# Patient Record
Sex: MALE | Race: WHITE | NOT HISPANIC OR LATINO | Employment: OTHER | URBAN - METROPOLITAN AREA
[De-identification: names, ages, dates, MRNs, and addresses within clinical notes are randomized per-mention and may not be internally consistent; named-entity substitution may affect disease eponyms.]

---

## 2017-02-09 ENCOUNTER — OFFICE VISIT (OUTPATIENT)
Dept: URGENT CARE | Facility: URGENT CARE | Age: 42
End: 2017-02-09
Payer: COMMERCIAL

## 2017-02-09 VITALS
OXYGEN SATURATION: 98 % | BODY MASS INDEX: 28.37 KG/M2 | HEART RATE: 119 BPM | WEIGHT: 192.2 LBS | TEMPERATURE: 98.5 F | DIASTOLIC BLOOD PRESSURE: 80 MMHG | SYSTOLIC BLOOD PRESSURE: 119 MMHG

## 2017-02-09 DIAGNOSIS — R07.9 CHEST PAIN, UNSPECIFIED TYPE: Primary | ICD-10-CM

## 2017-02-09 PROCEDURE — 93000 ELECTROCARDIOGRAM COMPLETE: CPT | Performed by: FAMILY MEDICINE

## 2017-02-09 PROCEDURE — 99215 OFFICE O/P EST HI 40 MIN: CPT | Performed by: FAMILY MEDICINE

## 2017-02-09 NOTE — MR AVS SNAPSHOT
"              After Visit Summary   2017    Carloz Nicolas    MRN: [id number]           Patient Information     Date Of Birth          1975        Visit Information        Provider Department      2017 7:25 PM Gina Chávez MD [facility]        Today's Diagnoses     Chest pain    -  1        Follow-ups after your visit        Who to contact     If you have questions or need follow up information about today's clinic visit or your schedule please contact [facility] directly at [phone].  Normal or non-critical lab and imaging results will be communicated to you by MyChart, letter or phone within 4 business days after the clinic has received the results. If you do not hear from us within 7 days, please contact the clinic through BreatheAmericahart or phone. If you have a critical or abnormal lab result, we will notify you by phone as soon as possible.  Submit refill requests through LivBlends or call your pharmacy and they will forward the refill request to us. Please allow 3 business days for your refill to be completed.          Additional Information About Your Visit        MyChart Information     LivBlends lets you send messages to your doctor, view your test results, renew your prescriptions, schedule appointments and more. To sign up, go to [email or web address] . Click on \"Log in\" on the left side of the screen, which will take you to the Welcome page. Then click on \"Sign up Now\" on the right side of the page.     You will be asked to enter the access code listed below, as well as some personal information. Please follow the directions to create your username and password.     Your access code is: [email or web address]  Expires: 5/10/2017  8:12 PM     Your access code will  in 90 days. If you need help or a new code, please call your [facility] or [phone].        Care EveryWhere ID     This is your Care EveryWhere ID. This could be used by other organizations to " access your [place] medical records  [id number]        Your Vitals Were     Pulse Temperature Pulse Oximetry             119 98.5  F (36.9  C) (Oral) 98%          Blood Pressure from Last 3 Encounters:   02/09/17 119/80   07/28/16 131/86   06/26/16 123/77    Weight from Last 3 Encounters:   02/09/17 192 lb 3.2 oz (87.181 kg)   07/28/16 192 lb 12.8 oz (87.454 kg)   06/26/16 189 lb (85.73 kg)              We Performed the Following     EKG 12-lead complete w/read - Clinics        Primary Care Provider    None Specified       No primary provider on file.        Thank you!     Thank you for choosing [facility]. Our goal is always to provide you with excellent care. Hearing back from our patients is one way we can continue to improve our services. Please take a few minutes to complete the written survey that you may receive in the mail after your visit with us. Thank you!             Your Updated Medication List - Protect others around you: Learn how to safely use, store and throw away your medicines at www.disposemymeds.org.          This list is accurate as of: 2/9/17  8:12 PM.  Always use your most recent med list.                   Brand Name Dispense Instructions for use    ascorbic acid 1000 MG Tabs    vitamin C     Take 500 mg by mouth       atorvastatin 20 MG tablet    LIPITOR     TAKE 1 TABLET BY MOUTH EVERY DAY       celeBREX 200 MG capsule   Generic drug:  celecoxib      Take 200 mg by mouth       gabapentin 300 MG capsule    NEURONTIN     Take 900 mg by mouth       MULTI-VITAMIN/MINERALS Tabs      Take 1 tablet by mouth       oxyCODONE 5 MG IR tablet    ROXICODONE     Take 1-2 tablets by mouth every 4 hours if needed for Pain (max 8 per day)   USE DATES:06/0116-6/15/2016       trimethoprim-polymyxin b ophthalmic solution    POLYTRIM    1 Bottle    1 drop every 2 hours while awake both eyes first day then 1 to 2 drops every 6 hours until 2 days after redness is clear

## 2017-02-10 NOTE — PROGRESS NOTES
Cc: chest pain    Has a history of pericarditis    Has been having then past 2-3 weeks    Today was really bad. Still having chest pain  Very painful and constant    Also worse with laying down    Worse with exertion relieved by rest: YES  Worse with certain movements or position: YES  Associated with food intake or symptoms of GERD: no  Worse with taking in a deep breath: no  Cough/colds or respiratory symptoms: no  Signs or symptoms of DVT no    Problem list, Medication list, Allergies, and Medical/Social/Surgical histories reviewed in [facility] and updated as appropriate.      ROS:  General: negative for fever  Resp: chest pain as above  CV: + as above  ABD: Negative for abd pain.  Neurologic:negative for headache  10 point review of systems negative except for noted above.    OBJECTIVE:  /80 mmHg  Pulse 119  Temp(Src) 98.5  F (36.9  C) (Oral)  Wt 192 lb 3.2 oz (87.181 kg)  SpO2 98%   General:   awake, alert, and cooperative.  NAD.   Head: Normocephalic, atraumatic.  Eyes: Conjunctiva clear,   ENT: normal exam  Heart: Regular rate and rhythm. No murmur.  Lungs: Chest is clear; no wheezes or rales.   Abdomen: soft nontender  Neuro: Alert and oriented - normal speech. No gross neurologic deficits  Psych: Appropriate mood and affect.   Musculoskeletal: no joint swelling. no chest wall tenderness reproducible of pain. No calf pain or tenderness. No signs or symptoms of DVT   Vascular: no cyanosis      EKG done in clinic, reviewed this myself official cardiology report pending: no acute ST-Twave changes but has sinus tachycardia      ASSESSMENT:    ICD-10-CM    1. Chest pain R07.9 EKG 12-lead complete w/read - Clinics       ICD-10-CM    1. Chest pain R07.9 EKG 12-lead complete w/read - Clinics         PLAN:   Patient complaining of active chest pain  Given 4 baby ASA and oxygen 2L by nasal cannula   Transferred to ER for further evaluation and management and rule out of acute cardiorespiratory process. Discussed  with receiving ER facility physician/charge nurse. Patient went to [facility] by ambulance  Advised about symptoms which might herald more serious problems.    If with any worsening symptoms of chest pain or shortness of breath, or change in the quality of your symptoms, please proceed to ER. Recommend follow up with PCP in a few days for re-evaluation.       Gina Chávez MD

## 2017-02-10 NOTE — PROGRESS NOTES
"    SUBJECTIVE:                                                    Carloz Nicolas is a 41 year old male who presents to clinic today for the following health issues:      Chest Pain      Onset: ***    Description (location/character/radiation/duration): ***    Intensity:  severe    Accompanying signs and symptoms:        Shortness of breath: YES       Sweating: YES       Nausea/vomitting: no        Palpitations: YES       Other (fevers/chills/cough/heartburn/lightheadedness): { :328207}    History (similar episodes/previous evaluation): {NONE DEFAULTED:751505::\"None\"}    Precipitating or alleviating factors:       Worse with exertion: YES       Worse with breathing: YES       Related to eating: { :505904}       Better with burping: { :956038}    Therapies tried and outcome: attempted to go to ER and they had a 4 hour wait so he came here, informed him he would meet with provider and she will review his EKG.       {additional problems for provider to add:772613}    Problem list and histories reviewed & adjusted, as indicated.  Additional history: {NONE - AS DOCUMENTED:653514::\"as documented\"}    {HIST REVIEW/ LINKS 2:135269}    {PROVIDER CHARTING PREFERENCE:833777}    "

## 2017-02-10 NOTE — NURSING NOTE
Called ambulance and designated someone from the  to keep watch for them and they let them in and the ambulance brought pt to [facility].  Mirella Parker, MICHAEL

## 2017-02-10 NOTE — NURSING NOTE
"Chief Complaint   Patient presents with     Heart Problem     hx of pericarditis 5 years ago from chicken pox.       Initial /80 mmHg  Pulse 119  Temp(Src) 98.5  F (36.9  C) (Oral)  Wt 192 lb 3.2 oz (87.181 kg)  SpO2 98% Estimated body mass index is 28.37 kg/(m^2) as calculated from the following:    Height as of 6/26/16: 5' 9\" (1.753 m).    Weight as of this encounter: 192 lb 3.2 oz (87.181 kg).  Medication Reconciliation: complete   Mirella Parker, MICHAEL      "

## 2017-11-01 ENCOUNTER — OFFICE VISIT (OUTPATIENT)
Dept: FAMILY MEDICINE | Facility: CLINIC | Age: 42
End: 2017-11-01
Payer: COMMERCIAL

## 2017-11-01 VITALS
DIASTOLIC BLOOD PRESSURE: 83 MMHG | HEART RATE: 102 BPM | WEIGHT: 195 LBS | TEMPERATURE: 98.4 F | SYSTOLIC BLOOD PRESSURE: 134 MMHG | BODY MASS INDEX: 28.8 KG/M2 | OXYGEN SATURATION: 98 %

## 2017-11-01 DIAGNOSIS — D23.5 CYST, DERMOID, TRUNK: ICD-10-CM

## 2017-11-01 DIAGNOSIS — M67.432 GANGLION CYST OF WRIST, LEFT: Primary | ICD-10-CM

## 2017-11-01 PROCEDURE — 99213 OFFICE O/P EST LOW 20 MIN: CPT | Performed by: INTERNAL MEDICINE

## 2017-11-01 NOTE — NURSING NOTE
"Chief Complaint   Patient presents with     Mass     left forearm X at least 1 year, growing and getting painful       Initial /83  Pulse 102  Temp 98.4  F (36.9  C) (Oral)  Wt 195 lb (88.5 kg)  SpO2 98%  BMI 28.8 kg/m2 Estimated body mass index is 28.8 kg/(m^2) as calculated from the following:    Height as of 6/26/16: 5' 9\" (1.753 m).    Weight as of this encounter: 195 lb (88.5 kg).  Medication Reconciliation: complete   Saniya Us CMA      "

## 2017-11-01 NOTE — MR AVS SNAPSHOT
After Visit Summary   11/1/2017    Carloz Nicolas    MRN: [id number]           Patient Information     Date Of Birth          1975        Visit Information        Provider Department      11/1/2017 3:40 PM Dorothy Kang MD [facility]        Today's Diagnoses     Ganglion cyst of wrist, left    -  1    Cyst, dermoid, trunk           Follow-ups after your visit        Additional Services     GENERAL SURG ADULT REFERRAL       Your provider has referred you to: [facility]: [facility] [phone]   [email or web address]    Please be aware that coverage of these services is subject to the terms and limitations of your health insurance plan.  Call member services at your health plan with any benefit or coverage questions.      Please bring the following with you to your appointment:    (1) Any X-Rays, CTs or MRIs which have been performed.  Contact the facility where they were done to arrange for  prior to your scheduled appointment.   (2) List of current medications   (3) This referral request   (4) Any documents/labs given to you for this referral                  Follow-up notes from your care team     Return in about 1 week (around 11/8/2017) for with surgeon.      Who to contact     If you have questions or need follow up information about today's clinic visit or your schedule please contact [facility] directly at [phone].  Normal or non-critical lab and imaging results will be communicated to you by MyChart, letter or phone within 4 business days after the clinic has received the results. If you do not hear from us within 7 days, please contact the clinic through MyChart or phone. If you have a critical or abnormal lab result, we will notify you by phone as soon as possible.  Submit refill requests through Fanvibe or call your pharmacy and they will forward the refill request to us. Please allow 3 business days for  "your refill to be completed.          Additional Information About Your Visit        TabletKiosk[employer]The [employer]-Team Clubhouse Information     [employer] lets you send messages to your doctor, view your test results, renew your prescriptions, schedule appointments and more. To sign up, go to [email or web address] . Click on \"Log in\" on the left side of the screen, which will take you to the Welcome page. Then click on \"Sign up Now\" on the right side of the page.     You will be asked to enter the access code listed below, as well as some personal information. Please follow the directions to create your username and password.     Your access code is: [email or web address]  Expires: 2018  3:57 PM     Your access code will  in 90 days. If you need help or a new code, please call your [place] clinic or [phone].        Care EveryWhere ID     This is your Care EveryWhere ID. This could be used by other organizations to access your [place] medical records  [id number]        Your Vitals Were     Pulse Temperature Pulse Oximetry BMI (Body Mass Index)          102 98.4  F (36.9  C) (Oral) 98% 28.8 kg/m2         Blood Pressure from Last 3 Encounters:   17 134/83   17 119/80   16 131/86    Weight from Last 3 Encounters:   17 195 lb (88.5 kg)   17 192 lb 3.2 oz (87.2 kg)   16 192 lb 12.8 oz (87.5 kg)              We Performed the Following     GENERAL SURG ADULT REFERRAL        Primary Care Provider Office Phone # Fax #    Zeyad Ferreira PA-C [phone] [address] [address] [facility] [address]        Equal Access to Services     ERIKA CALLEJAS : Kilo Gamboa, dustin johnson, wendi espinozaalmajonathan perez, nicholas kwong. So [facility] [phone].    ATENCIÓN: Si habla español, tiene a isaac disposición servicios gratuitos de asistencia lingüística. Llame al [phone].    We comply with applicable federal civil rights laws and [place] laws. We do not " discriminate on the basis of race, color, national origin, age, disability, sex, sexual orientation, or gender identity.            Thank you!     Thank you for choosing [facility]  [facility]. Our goal is always to provide you with excellent care. Hearing back from our patients is one way we can continue to improve our services. Please take a few minutes to complete the written survey that you may receive in the mail after your visit with us. Thank you!             Your Updated Medication List - Protect others around you: Learn how to safely use, store and throw away your medicines at www.disposemymeds.org.          This list is accurate as of: 11/1/17  4:04 PM.  Always use your most recent med list.                   Brand Name Dispense Instructions for use Diagnosis    ascorbic acid 1000 MG Tabs    vitamin C     Take 500 mg by mouth        atorvastatin 20 MG tablet    LIPITOR     TAKE 1 TABLET BY MOUTH EVERY DAY        celeBREX 200 MG capsule   Generic drug:  celecoxib      Take 200 mg by mouth        gabapentin 300 MG capsule    NEURONTIN     Take 900 mg by mouth        MULTI-VITAMIN/MINERALS Tabs      Take 1 tablet by mouth        oxyCODONE 5 MG IR tablet    ROXICODONE     Take 1-2 tablets by mouth every 4 hours if needed for Pain (max 8 per day)   USE DATES:06/0116-6/15/2016

## 2017-11-01 NOTE — PROGRESS NOTES
SUBJECTIVE:  Carloz Nicolas is an 42 year old male who presents for lump on left lower arm.  Not sure how long he's had it, but at least a year.  Seems to be growing and getting more painful.  Notices it at various times, notices it with certain movements, especially putting arms above head.  Pain seems to radiate up from the bump and isn't so much the bump itself that hurts.  No fevers, chills.  No skin rashes.  No recent travel.  Not hurt to make a fist.  Hasn't ever become smaller, but seems to keep gradually getting bigger.  Also has a bump on back growing over three years, doesn't hurt and hasn't drained.        PMH: high cholesterol, chicken pox as adult, kidney stones, pneumonia, crps,pericarditis, epididymitis   ALLERGIES:  Review of patient's allergies indicates no known allergies.    Current Outpatient Prescriptions   Medication     ascorbic acid (VITAMIN C) 1000 MG TABS     gabapentin (NEURONTIN) 300 MG capsule     Multiple Vitamins-Minerals (MULTI-VITAMIN/MINERALS) TABS     oxyCODONE (ROXICODONE) 5 MG immediate release tablet     celecoxib (CELEBREX) 200 MG capsule     atorvastatin (LIPITOR) 20 MG tablet     No current facility-administered medications for this visit.          ROS:  ROS is done and is negative for general, constitutional, eye, ENT, Respiratory, cardiovascular, GI, , Skin, musculoskeletal except as noted elsewhere.      OBJECTIVE:  /83  Pulse 102  Temp 98.4  F (36.9  C) (Oral)  Wt 195 lb (88.5 kg)  SpO2 98%  BMI 28.8 kg/m2  GENERAL APPEARANCE: Alert, in no acute distress  EYES: normal  NOSE:normal  OROPHARYNX:normal  NECK:No adenopathy,masses or thyromegaly  RESP: normal and clear to auscultation  CV:regular rate and rhythm and no murmurs, clicks, or gallops  ABDOMEN: Abdomen soft, non-tender. BS normal. No masses, organomegaly  LEFT ARM: proximal to anterior wrist, there is a 12 mm firm, mobile mass, non-tender, no erythema or bruising or surrounding edema  BACK: lower mid  back just left of midline, just left of surgical scar, there is a 1 cm firm mass, non-tender, no edema or erythema or bruising.    RESULTS  .  No results found for this or any previous visit (from the past 48 hour(s)).    ASSESSMENT/PLAN:    ASSESSMENT / PLAN:  (M67.432) Ganglion cyst of wrist, left  (primary encounter diagnosis)  Comment: arm mass most c/w ganglion cyst  Plan: GENERAL SURG ADULT REFERRAL        Refer to surgery to further evaluate type of mass and if removal indicated.  I reviewed supportive care, expected course, and signs of concern.  Follow up as needed or if worsens in any way.  Reviewed red flag symptoms and is to go to the ER if experiences any of these.    (D23.5) Cyst, dermoid, trunk  Comment: back mass most c/w benign cyst  Plan: GENERAL SURG ADULT REFERRAL        Refer to surgery to further evaluate the lesion and determine if removal indicated.  I reviewed supportive care, expected course, and signs of concern.  Follow up as needed or if worsens in any way.  Reviewed red flag symptoms and is to go to the ER if experiences any of these.    Pt agrees to schedule appt with surgeon soon.      See [facility] for orders, medications, letters, patient instructions    Dorothy Kang M.D.

## 2018-04-26 ENCOUNTER — OFFICE VISIT (OUTPATIENT)
Dept: URGENT CARE | Facility: URGENT CARE | Age: 43
End: 2018-04-26
Payer: COMMERCIAL

## 2018-04-26 VITALS
DIASTOLIC BLOOD PRESSURE: 89 MMHG | SYSTOLIC BLOOD PRESSURE: 161 MMHG | HEART RATE: 118 BPM | RESPIRATION RATE: 16 BRPM | TEMPERATURE: 97.7 F | OXYGEN SATURATION: 97 % | WEIGHT: 194.8 LBS | BODY MASS INDEX: 28.77 KG/M2

## 2018-04-26 DIAGNOSIS — R07.9 ACUTE CHEST PAIN: Primary | ICD-10-CM

## 2018-04-26 PROCEDURE — 99215 OFFICE O/P EST HI 40 MIN: CPT | Performed by: FAMILY MEDICINE

## 2018-04-26 PROCEDURE — 93000 ELECTROCARDIOGRAM COMPLETE: CPT | Performed by: FAMILY MEDICINE

## 2018-04-26 NOTE — MR AVS SNAPSHOT
"              After Visit Summary   2018    Carloz Nicolas    MRN: [id number]           Patient Information     Date Of Birth          1975        Visit Information        Provider Department      2018 8:55 PM Sean Geiger MD [facility]        Today's Diagnoses     Acute chest pain    -  1       Follow-ups after your visit        Who to contact     If you have questions or need follow up information about today's clinic visit or your schedule please contact [facility] directly at [phone].  Normal or non-critical lab and imaging results will be communicated to you by MyChart, letter or phone within 4 business days after the clinic has received the results. If you do not hear from us within 7 days, please contact the clinic through MyChart or phone. If you have a critical or abnormal lab result, we will notify you by phone as soon as possible.  Submit refill requests through ViaView or call your pharmacy and they will forward the refill request to us. Please allow 3 business days for your refill to be completed.          Additional Information About Your Visit        MyChart Information     ViaView lets you send messages to your doctor, view your test results, renew your prescriptions, schedule appointments and more. To sign up, go to [email or web address] . Click on \"Log in\" on the left side of the screen, which will take you to the Welcome page. Then click on \"Sign up Now\" on the right side of the page.     You will be asked to enter the access code listed below, as well as some personal information. Please follow the directions to create your username and password.     Your access code is: [email or web address]  Expires: 2018  8:09 AM     Your access code will  in 90 days. If you need help or a new code, please call your [facility] or [phone].        Care EveryWhere ID     This is your Care EveryWhere ID. This could be used by other organizations to " access your [place] medical records  [id number]        Your Vitals Were     Pulse Temperature Respirations Pulse Oximetry BMI (Body Mass Index)       118 97.7  F (36.5  C) 16 97% 28.77 kg/m2        Blood Pressure from Last 3 Encounters:   04/26/18 161/89   11/01/17 134/83   02/09/17 119/80    Weight from Last 3 Encounters:   04/26/18 194 lb 12.8 oz (88.4 kg)   11/01/17 195 lb (88.5 kg)   02/09/17 192 lb 3.2 oz (87.2 kg)              We Performed the Following     EKG 12-lead complete w/read - Clinics        Primary Care Provider Office Phone # Fax #    Zeyad Ferreira PA-C [phone] [address] [facility] [address]        Equal Access to Services     [facility]: Hadii aad ku hadasho Soomaali, waaxda luqadaha, qaybta kaalmada adeegyada, nicholas fry haynathen noriega . So [facility] [phone].    ATENCIÓN: Si habla español, tiene a isaac disposición servicios gratuitos de asistencia lingüística. Llame al [phone].    We comply with applicable federal civil rights laws and [place] laws. We do not discriminate on the basis of race, color, national origin, age, disability, sex, sexual orientation, or gender identity.            Thank you!     Thank you for choosing [facility]  for your care. Our goal is always to provide you with excellent care. Hearing back from our patients is one way we can continue to improve our services. Please take a few minutes to complete the written survey that you may receive in the mail after your visit with us. Thank you!             Your Updated Medication List - Protect others around you: Learn how to safely use, store and throw away your medicines at www.disposemymeds.org.          This list is accurate as of 4/26/18 11:59 PM.  Always use your most recent med list.                   Brand Name Dispense Instructions for use Diagnosis    ascorbic acid 1000 MG Tabs    vitamin C     Take 500 mg by mouth        atorvastatin 20 MG  tablet    LIPITOR     TAKE 1 TABLET BY MOUTH EVERY DAY        celeBREX 200 MG capsule   Generic drug:  celecoxib      Take 200 mg by mouth        gabapentin 300 MG capsule    NEURONTIN     Take 900 mg by mouth        MULTI-VITAMIN/MINERALS Tabs      Take 1 tablet by mouth        oxyCODONE IR 5 MG tablet    ROXICODONE     Take 1-2 tablets by mouth every 4 hours if needed for Pain (max 8 per day)   USE DATES:06/0116-6/15/2016

## 2018-04-27 NOTE — PROGRESS NOTES
SUBJECTIVE:                                                    Carloz Nicolas is a 42 year old male who presents to clinic today for the following health issues:      Chest Pain      Onset: yesterday afternoon     Description (location/character/radiation/duration): left sided chest dull ache, radiating numbness to left arm     Intensity:  Mild to moderate    Accompanying signs and symptoms:        Shortness of breath: no        Sweating: no        Nausea/vomitting: nauseous        Palpitations: no        Other (fevers/chills/cough/heartburn/lightheadedness): no     History (similar episodes/previous evaluation): History of pericarditis    Precipitating or alleviating factors:       Worse with exertion: no        Worse with breathing: no        Related to eating: no        Better with burping: no     Therapies tried and outcome: None    Ex-smoker, owns business, denies any other relevant systemic symptoms        Problem list and histories reviewed & adjusted, as indicated.  Additional history: as documented    There is no problem list on file for this patient.    No past surgical history on file.    Social History   Substance Use Topics     Smoking status: Former Smoker     Packs/day: 1.00     Smokeless tobacco: Not on file     Alcohol use No     No family history on file.      Current Outpatient Prescriptions   Medication Sig Dispense Refill     ascorbic acid (VITAMIN C) 1000 MG TABS Take 500 mg by mouth       atorvastatin (LIPITOR) 20 MG tablet TAKE 1 TABLET BY MOUTH EVERY DAY       celecoxib (CELEBREX) 200 MG capsule Take 200 mg by mouth       gabapentin (NEURONTIN) 300 MG capsule Take 900 mg by mouth       Multiple Vitamins-Minerals (MULTI-VITAMIN/MINERALS) TABS Take 1 tablet by mouth       oxyCODONE (ROXICODONE) 5 MG immediate release tablet Take 1-2 tablets by mouth every 4 hours if needed for Pain (max 8 per day)   USE DATES:06/0116-6/15/2016       No Known Allergies  No lab results found.   BP Readings  from Last 3 Encounters:   11/01/17 134/83   02/09/17 119/80   07/28/16 131/86    Wt Readings from Last 3 Encounters:   11/01/17 195 lb (88.5 kg)   02/09/17 192 lb 3.2 oz (87.2 kg)   07/28/16 192 lb 12.8 oz (87.5 kg)                  Labs reviewed in EPIC    ROS:  Constitutional, HEENT, cardiovascular, pulmonary, GI, , musculoskeletal, neuro, skin, endocrine and psych systems are negative, except as otherwise noted.    OBJECTIVE:     /89  Pulse 118  Temp 97.7  F (36.5  C)  Resp 16  Wt 194 lb 12.8 oz (88.4 kg)  SpO2 97%  BMI 28.77 kg/m2    GENERAL: alert and no distress  EYES: Eyes grossly normal to inspection, PERRL and conjunctivae and sclerae normal  NECK: no adenopathy, no asymmetry, masses, or scars and thyroid normal to palpation  RESP: lungs clear to auscultation - no rales, rhonchi or wheezes  CV: tachycardia, normal S1 S2, no S3 or S4, no murmur, click or rub, peripheral pulses strong and no peripheral edema  ABDOMEN: soft, nontender, no hepatosplenomegaly, no masses and bowel sounds normal  MS: no gross musculoskeletal defects noted, no edema  SKIN: no suspicious lesions or rashes  NEURO: Normal strength and tone, mentation intact and speech normal    ECG: NSR    ASSESSMENT/PLAN:         ICD-10-CM    1. Acute chest pain R07.9 EKG 12-lead complete w/read - Clinics       42-year-old male presents with left-sided chest pain radiating to left arm along with some nausea, symptoms started yesterday, constant, 5 out of 10 intensity.  Previous history of pericarditis, ex-smoker.  Physical examination remarkable for mild hypertension along with tachycardia. EKG showed no ischemic changes.  Differentials are broad including non-STEMI, noted similar episode back in February 2017, ER workup was negative at that time.  Chewable 81 mg aspirin given to the patient. Patient deferred ER transfer by ambulance, son will drive him. [facility] physician informed.  Patient understood and in agreement with the above  plan.  All questions answered.      Sean Geiger MD  [facility]

## 2018-04-27 NOTE — NURSING NOTE
The following medication was given:     MEDICATION: Adult Aspirin   ROUTE: PO  SITE: Medication was given orally   DOSE: 324mg   LOT #: [id number]  :  [employer]  EXPIRATION DATE:  1/01/2019  NDC: 92588-116-77    Angela Phelan MA

## 2018-06-14 ENCOUNTER — OFFICE VISIT (OUTPATIENT)
Dept: FAMILY MEDICINE | Facility: CLINIC | Age: 43
End: 2018-06-14
Payer: COMMERCIAL

## 2018-06-14 VITALS
TEMPERATURE: 98.4 F | DIASTOLIC BLOOD PRESSURE: 79 MMHG | OXYGEN SATURATION: 96 % | SYSTOLIC BLOOD PRESSURE: 123 MMHG | BODY MASS INDEX: 28.5 KG/M2 | HEART RATE: 109 BPM | WEIGHT: 193 LBS

## 2018-06-14 DIAGNOSIS — M25.50 MULTIPLE JOINT PAIN: Primary | ICD-10-CM

## 2018-06-14 LAB
ALBUMIN SERPL-MCNC: 3.9 G/DL (ref 3.4–5)
ALP SERPL-CCNC: 83 U/L (ref 40–150)
ALT SERPL W P-5'-P-CCNC: 29 U/L (ref 0–70)
ANION GAP SERPL CALCULATED.3IONS-SCNC: 7 MMOL/L (ref 3–14)
AST SERPL W P-5'-P-CCNC: 19 U/L (ref 0–45)
BASOPHILS # BLD AUTO: 0 10E9/L (ref 0–0.2)
BASOPHILS NFR BLD AUTO: 0.3 %
BILIRUB SERPL-MCNC: 0.4 MG/DL (ref 0.2–1.3)
BUN SERPL-MCNC: 15 MG/DL (ref 7–30)
CALCIUM SERPL-MCNC: 8.9 MG/DL (ref 8.5–10.1)
CHLORIDE SERPL-SCNC: 104 MMOL/L (ref 94–109)
CO2 SERPL-SCNC: 27 MMOL/L (ref 20–32)
CREAT SERPL-MCNC: 0.95 MG/DL (ref 0.66–1.25)
DIFFERENTIAL METHOD BLD: ABNORMAL
EOSINOPHIL # BLD AUTO: 0.2 10E9/L (ref 0–0.7)
EOSINOPHIL NFR BLD AUTO: 2.6 %
ERYTHROCYTE [DISTWIDTH] IN BLOOD BY AUTOMATED COUNT: 13.1 % (ref 10–15)
ERYTHROCYTE [SEDIMENTATION RATE] IN BLOOD BY WESTERGREN METHOD: 4 MM/H (ref 0–15)
GFR SERPL CREATININE-BSD FRML MDRD: 87 ML/MIN/1.7M2
GLUCOSE SERPL-MCNC: 103 MG/DL (ref 70–99)
HCT VFR BLD AUTO: 52.1 % (ref 40–53)
HGB BLD-MCNC: 18.4 G/DL (ref 13.3–17.7)
LYMPHOCYTES # BLD AUTO: 2.8 10E9/L (ref 0.8–5.3)
LYMPHOCYTES NFR BLD AUTO: 40.8 %
MCH RBC QN AUTO: 32.1 PG (ref 26.5–33)
MCHC RBC AUTO-ENTMCNC: 35.3 G/DL (ref 31.5–36.5)
MCV RBC AUTO: 91 FL (ref 78–100)
MONOCYTES # BLD AUTO: 0.6 10E9/L (ref 0–1.3)
MONOCYTES NFR BLD AUTO: 9.1 %
NEUTROPHILS # BLD AUTO: 3.2 10E9/L (ref 1.6–8.3)
NEUTROPHILS NFR BLD AUTO: 47.2 %
PLATELET # BLD AUTO: 260 10E9/L (ref 150–450)
POTASSIUM SERPL-SCNC: 4.5 MMOL/L (ref 3.4–5.3)
PROT SERPL-MCNC: 7.6 G/DL (ref 6.8–8.8)
RBC # BLD AUTO: 5.73 10E12/L (ref 4.4–5.9)
SODIUM SERPL-SCNC: 138 MMOL/L (ref 133–144)
WBC # BLD AUTO: 6.8 10E9/L (ref 4–11)

## 2018-06-14 PROCEDURE — 80053 COMPREHEN METABOLIC PANEL: CPT | Performed by: INTERNAL MEDICINE

## 2018-06-14 PROCEDURE — 99214 OFFICE O/P EST MOD 30 MIN: CPT | Performed by: INTERNAL MEDICINE

## 2018-06-14 PROCEDURE — 86431 RHEUMATOID FACTOR QUANT: CPT | Performed by: INTERNAL MEDICINE

## 2018-06-14 PROCEDURE — 86618 LYME DISEASE ANTIBODY: CPT | Performed by: INTERNAL MEDICINE

## 2018-06-14 PROCEDURE — 85652 RBC SED RATE AUTOMATED: CPT | Performed by: INTERNAL MEDICINE

## 2018-06-14 PROCEDURE — 36415 COLL VENOUS BLD VENIPUNCTURE: CPT | Performed by: INTERNAL MEDICINE

## 2018-06-14 PROCEDURE — 85025 COMPLETE CBC W/AUTO DIFF WBC: CPT | Performed by: INTERNAL MEDICINE

## 2018-06-14 RX ORDER — ZOLPIDEM TARTRATE 5 MG/1
5 TABLET ORAL
COMMUNITY

## 2018-06-14 NOTE — PROGRESS NOTES
SUBJECTIVE:  Carloz Nicolas is an 42 year old male who presents for aching.  All of joints seem to feel stiff and achy, less in hands and wrists than other joints.  Sometimes feels like nerve pain, sometimes muscle pain.  Wants to be tested for lyme disease.  Feels tired.  Has crps over the past 5 years and is generally localized to his knee and has been managed in a way that has been okay for him.  Sometimes wakes up at night from pain.  The bad all over aching has been over the past 2-3 months, worse over the past 2-3 weeks.  Had some epidurals for neck in past.  Also has spinal stimulator implanted for his pain which in past has helped but isn't helping his current pain.  The pain he's been having is different than his usual type of pain.  Is on oxycodone for his ongoing pain. Is also on celebrex and gabapentin.  No recent travel.  Has had two deer ticks on skin but not sure if just crawling on him or if attached, one about a month ago and one last week.  No fevers, chills, sweats.  No n/v/d except occasional nausea from his pain medications.  No new activities or falls or accidents.      PMH: crps  Social History     Social History     Marital status:      Spouse name: N/A     Number of children: N/A     Years of education: N/A     Social History Main Topics     Smoking status: Former Smoker     Packs/day: 1.00     Smokeless tobacco: Not on file     Alcohol use No     Drug use: No     Sexual activity: Not on file     Other Topics Concern     Not on file     Social History Narrative       FH: neg for joint problems  ALLERGIES:  Review of patient's allergies indicates no known allergies.    Current Outpatient Prescriptions   Medication     ascorbic acid (VITAMIN C) 1000 MG TABS     celecoxib (CELEBREX) 200 MG capsule     gabapentin (NEURONTIN) 300 MG capsule     Multiple Vitamins-Minerals (MULTI-VITAMIN/MINERALS) TABS     oxyCODONE (ROXICODONE) 5 MG immediate release tablet     zolpidem (AMBIEN) 5 MG tablet      No current facility-administered medications for this visit.          ROS:  ROS is done and is negative for general/constitutional, eye, ENT, Respiratory, cardiovascular, GI, , Skin, musculoskeletal except as noted elsewhere.  All other review of systems negative except as noted elsewhere.      OBJECTIVE:  /79  Pulse 109  Temp 98.4  F (36.9  C) (Oral)  Wt 193 lb (87.5 kg)  SpO2 96%  BMI 28.5 kg/m2  GENERAL APPEARANCE: Alert, in no acute distress  EYES: normal  EARS: External ears normal. Canals clear. TM's normal.  NOSE:normal  OROPHARYNX:normal  NECK:No adenopathy,masses or thyromegaly  RESP: normal and clear to auscultation  CV:regular rate and rhythm and no murmurs, clicks, or gallops  ABDOMEN: Abdomen soft, non-tender. BS normal. No masses, organomegaly  SKIN: no ulcers, lesions or rash  MUSCULOSKELETAL:Musculoskeletal normal.  Joints are non-tender to palpation.  No edema of joints.      RESULTS  .  No results found for this or any previous visit (from the past 48 hour(s)).    ASSESSMENT/PLAN:    ASSESSMENT / PLAN:  (M25.50) Multiple joint pain  (primary encounter diagnosis)  Comment: it feels different to the pt than his crps, although could still be related.  Also consider other etiologies such as infectious, rheumatologic, arthritis, etc.  Plan: Erythrocyte sedimentation rate auto, CBC with         platelets differential, Rheumatoid factor,         RHEUMATOLOGY REFERRAL, Comprehensive metabolic         panel, Lyme Disease Mehnaz with reflex to WB Serum        Discussed with pt and will do some initial labs today and have him f/u with rheumatologist soon. I reviewed supportive care, expected course, and signs of concern.  Follow up with rheumatology soon or see pcp if worsens in any way.  Reviewed red flag symptoms and is to go to the ER if experiences any of these.      See [facility] for orders, medications, letters, patient instructions    Dorothy Kang M.D.

## 2018-06-14 NOTE — MR AVS SNAPSHOT
After Visit Summary   6/14/2018    Carloz Nicolas    MRN: [id number]           Patient Information     Date Of Birth          1975        Visit Information        Provider Department      6/14/2018 1:20 PM Dorothy Kang MD [facility]        Today's Diagnoses     Multiple joint pain    -  1       Follow-ups after your visit        Additional Services     RHEUMATOLOGY REFERRAL       Your provider has referred you to:   FM[facility]: [facility] [phone]   [email or web address]  FMG: [facility] [place] [phone]   [email or web address]  UMP: [facility] [phone]   [email or web address]  FH[facility]: [facility] [phone] Fax [phone] [email or web address]  [facility] [place] [phone]   [email or web address]    Please be aware that coverage of these services is subject to the terms and limitations of your health insurance plan.  Call member services at your health plan with any benefit or coverage questions.      Please bring the following with you to your appointment:    (1) Any X-Rays, CTs or MRIs which have been performed.  Contact the facility where they were done to arrange for  prior to your scheduled appointment.    (2) List of current medications   (3) This referral request   (4) Any documents/labs given to you for this referral                  Follow-up notes from your care team     Return in about 1 week (around 6/21/2018) for rheumatologist.      Who to contact     If you have questions or need follow up information about today's clinic visit or your schedule please contact [facility] directly at [phone].  Normal or non-critical lab and imaging results will be communicated to you by Rafael  letter or phone within 4 business days after the clinic has received the results. If you do not hear from us within 7 days, please contact the clinic through [employer] or phone. If you have a critical or abnormal lab result, we will notify you by phone as soon as possible.  Submit refill requests through [employer] or call your pharmacy and they will forward the refill request to us. Please allow 3 business days for your refill to be completed.          Additional Information About Your Visit        Care EveryWhere ID     This is your Care EveryWhere ID. This could be used by other organizations to access your [facility] medical records  [id number]        Your Vitals Were     Pulse Temperature Pulse Oximetry BMI (Body Mass Index)          109 98.4  F (36.9  C) (Oral) 96% 28.5 kg/m2         Blood Pressure from Last 3 Encounters:   06/14/18 123/79   04/26/18 161/89   11/01/17 134/83    Weight from Last 3 Encounters:   06/14/18 193 lb (87.5 kg)   04/26/18 194 lb 12.8 oz (88.4 kg)   11/01/17 195 lb (88.5 kg)              We Performed the Following     CBC with platelets differential     Comprehensive metabolic panel     Erythrocyte sedimentation rate auto     Lyme Disease Mehnaz with reflex to WB Serum     Rheumatoid factor     RHEUMATOLOGY REFERRAL        Primary Care Provider Office Phone # Fax #    Zeyad Ferreira PA-C [phone] [facility] [address]        Equal Access to Services     KAREN CALLEJAS : Hadii aad ku hadasho Sojodyali, waaxda luqadaha, qaybta kaalmada ademariajoseyada, nicholas noriega . So [facility] [place] [facility] [phone].    ATENCIÓN: Si habla español, tiene a isaac disposición servicios gratuitos de asistencia lingüística. Llame al [phone].    We comply with applicable federal civil rights laws and [place] laws. We do not discriminate on the basis of race, color, national origin, age, disability, sex, sexual orientation, or gender identity.            Thank you!      Thank you for choosing [facility]  for your care. Our goal is always to provide you with excellent care. Hearing back from our patients is one way we can continue to improve our services. Please take a few minutes to complete the written survey that you may receive in the mail after your visit with us. Thank you!             Your Updated Medication List - Protect others around you: Learn how to safely use, store and throw away your medicines at www.disposemymeds.org.          This list is accurate as of 6/14/18  1:56 PM.  Always use your most recent med list.                   Brand Name Dispense Instructions for use Diagnosis    ascorbic acid 1000 MG Tabs    vitamin C     Take 500 mg by mouth        celeBREX 200 MG capsule   Generic drug:  celecoxib      Take 200 mg by mouth        gabapentin 300 MG capsule    NEURONTIN     Take 900 mg by mouth        MULTI-VITAMIN/MINERALS Tabs      Take 1 tablet by mouth        oxyCODONE IR 5 MG tablet    ROXICODONE     Take 1-2 tablets by mouth every 4 hours if needed for Pain (max 8 per day)   USE DATES:06/0116-6/15/2016        zolpidem 5 MG tablet    AMBIEN     Take 5 mg by mouth nightly as needed

## 2018-06-15 LAB
B BURGDOR IGG+IGM SER QL: 0.06 (ref 0–0.89)
RHEUMATOID FACT SER NEPH-ACNC: <20 IU/ML (ref 0–20)

## 2018-10-26 ENCOUNTER — RADIANT APPOINTMENT (OUTPATIENT)
Dept: GENERAL RADIOLOGY | Facility: CLINIC | Age: 43
End: 2018-10-26
Attending: PHYSICIAN ASSISTANT
Payer: COMMERCIAL

## 2018-10-26 ENCOUNTER — OFFICE VISIT (OUTPATIENT)
Dept: FAMILY MEDICINE | Facility: CLINIC | Age: 43
End: 2018-10-26
Payer: COMMERCIAL

## 2018-10-26 VITALS
TEMPERATURE: 98.2 F | WEIGHT: 206 LBS | BODY MASS INDEX: 30.51 KG/M2 | OXYGEN SATURATION: 96 % | SYSTOLIC BLOOD PRESSURE: 132 MMHG | HEIGHT: 69 IN | RESPIRATION RATE: 16 BRPM | DIASTOLIC BLOOD PRESSURE: 76 MMHG | HEART RATE: 117 BPM

## 2018-10-26 DIAGNOSIS — G56.03 BILATERAL CARPAL TUNNEL SYNDROME: ICD-10-CM

## 2018-10-26 DIAGNOSIS — M25.511 CHRONIC PAIN OF BOTH SHOULDERS: ICD-10-CM

## 2018-10-26 DIAGNOSIS — G89.29 CHRONIC PAIN OF BOTH SHOULDERS: ICD-10-CM

## 2018-10-26 DIAGNOSIS — M25.512 CHRONIC PAIN OF BOTH SHOULDERS: ICD-10-CM

## 2018-10-26 DIAGNOSIS — G89.29 CHRONIC PAIN OF BOTH SHOULDERS: Primary | ICD-10-CM

## 2018-10-26 DIAGNOSIS — M54.2 NECK PAIN: ICD-10-CM

## 2018-10-26 DIAGNOSIS — M25.512 CHRONIC PAIN OF BOTH SHOULDERS: Primary | ICD-10-CM

## 2018-10-26 DIAGNOSIS — G90.521 COMPLEX REGIONAL PAIN SYNDROME TYPE 1 OF RIGHT LOWER EXTREMITY: ICD-10-CM

## 2018-10-26 DIAGNOSIS — M25.511 CHRONIC PAIN OF BOTH SHOULDERS: Primary | ICD-10-CM

## 2018-10-26 PROCEDURE — 99214 OFFICE O/P EST MOD 30 MIN: CPT | Performed by: PHYSICIAN ASSISTANT

## 2018-10-26 PROCEDURE — 73030 X-RAY EXAM OF SHOULDER: CPT | Mod: LT

## 2018-10-26 PROCEDURE — 73030 X-RAY EXAM OF SHOULDER: CPT | Mod: RT

## 2018-10-26 NOTE — MR AVS SNAPSHOT
After Visit Summary   10/26/2018    Carloz Nicolas    MRN: [id number]           Patient Information     Date Of Birth          1975        Visit Information        Provider Department      10/26/2018 12:50 PM Zeyad Ferreira PA-C [facility]        Today's Diagnoses     Chronic pain of both shoulders    -  1    Neck pain        Bilateral carpal tunnel syndrome        Complex regional pain syndrome type 1 of right lower extremity           Follow-ups after your visit        Additional Services     [facility] PT, HAND, AND CHIROPRACTIC REFERRAL       Physical Therapy, Hand Therapy and Chiropractic Care are available through:  *[facility]  *Hand Therapy (Occupational Therapy or Physical Therapy)  *[facility]    Call one number to schedule at any of the above locations: [phone].    Physical therapy, Hand therapy and/or Chiropractic care has been recommended by your physician as an excellent treatment option to reduce pain and help people return to normal activities, including sports.  Therapy and/or chiropractic care services are a great complement or alternative to expensive and invasive surgery, injections, or long-term use of prescription medications. The primary goal is to identify the underlying problem and provide you the tools to manage your condition on your own.     Please be aware that coverage of these services is subject to the terms and limitations of your health insurance plan.  Call member services at your health plan with any benefit or coverage questions.      Please bring the following to your appointment:  *Your personal calendar for scheduling future appointments  *Comfortable clothing                  Future tests that were ordered for you today     Open Future Orders        Priority Expected Expires Ordered    XR Shoulder Left G/E 3 Views Routine 10/26/2018 10/26/2019 10/26/2018    XR Shoulder Right G/E 3 Views  "Routine 10/26/2018 10/26/2019 10/26/2018    [facility] PT, HAND, AND CHIROPRACTIC REFERRAL Routine  10/26/2019 10/26/2018            Who to contact     If you have questions or need follow up information about today's clinic visit or your schedule please contact [facility] directly at [phone].  Normal or non-critical lab and imaging results will be communicated to you by MyChart, letter or phone within 4 business days after the clinic has received the results. If you do not hear from us within 7 days, please contact the clinic through [employer] or phone. If you have a critical or abnormal lab result, we will notify you by phone as soon as possible.  Submit refill requests through [employer] or call your pharmacy and they will forward the refill request to us. Please allow 3 business days for your refill to be completed.          Additional Information About Your Visit        [employer] Information     [employer] gives you secure access to your electronic health record. If you see a primary care provider, you can also send messages to your care team and make appointments. If you have questions, please call your primary care clinic.  If you do not have a primary care provider, please call [phone] and they will assist you.        Care EveryWhere ID     This is your Care EveryWhere ID. This could be used by other organizations to access your [facility] medical records  [id number]        Your Vitals Were     Pulse Temperature Respirations Height Pulse Oximetry BMI (Body Mass Index)    117 98.2  F (36.8  C) (Oral) 16 5' 9\" (1.753 m) 96% 30.42 kg/m2       Blood Pressure from Last 3 Encounters:   10/26/18 132/76   06/14/18 123/79   04/26/18 161/89    Weight from Last 3 Encounters:   10/26/18 206 lb (93.4 kg)   06/14/18 193 lb (87.5 kg)   04/26/18 194 lb 12.8 oz (88.4 kg)              We Performed the Following     WRIST BRACE,EXTEN.CONTROL        Primary Care Provider    Provider Not In System                Equal " Access to Services     [facility]: Hadii aad ku hadelsavictor m Jaceycora, wabrentonda luqludivinaha, qamalgorzata olgamarlinnicholas byrd. So [facility] [phone].    ATENCIÓN: Si habla georgie, tiene a isaac disposición servicios gratuitos de asistencia lingüística. Llame al [phone].    We comply with applicable federal civil rights laws and [place] laws. We do not discriminate on the basis of race, color, national origin, age, disability, sex, sexual orientation, or gender identity.            Thank you!     Thank you for [facility]. Our goal is always to provide you with excellent care. Hearing back from our patients is one way we can continue to improve our services. Please take a few minutes to complete the written survey that you may receive in the mail after your visit with us. Thank you!             Your Updated Medication List - Protect others around you: Learn how to safely use, store and throw away your medicines at www.disposemymeds.org.          This list is accurate as of 10/26/18  1:35 PM.  Always use your most recent med list.                   Brand Name Dispense Instructions for use Diagnosis    ascorbic acid 1000 MG Tabs    vitamin C     Take 500 mg by mouth        celeBREX 200 MG capsule   Generic drug:  celecoxib      Take 200 mg by mouth        FISH OIL OMEGA-3 PO           gabapentin 300 MG capsule    NEURONTIN     Take 900 mg by mouth        GLUCOSAMINE SULFATE PO           MULTI-VITAMIN/MINERALS Tabs      Take 1 tablet by mouth        oxyCODONE IR 5 MG tablet    ROXICODONE     Take 1-2 tablets by mouth every 4 hours if needed for Pain (max 8 per day)   USE DATES:06/0116-6/15/2016        zolpidem 5 MG tablet    AMBIEN     Take 5 mg by mouth nightly as needed

## 2018-10-26 NOTE — PROGRESS NOTES
SUBJECTIVE:   Carloz Nicolas is a 43 year old male who presents to clinic today for the following health issues:  New patient to me.     Bilateral shoulder pain for couple years and numbness now into his arms and hands. This has been getting worse over the last couple months.  Rt hand is having more pain than the LT. Wakes him up at night.     History of CRPS in right knee after twisting it playing with his kids. See's pain clinic.   History of neck pain with nerve ablations.     Also wanting to have full work up for testosterone. Has CRPS and is concerned meds he takes for this is affecting his hormone levels. Is looking into seeing a testosterone clinic and they want these labs done.   Testosterone serum   Free testosterone  Bioavailable testosterone  SHBG  Estrogen  sensitive estrogen  Prolactin  hematocrict  DHT       Problem list and histories reviewed & adjusted, as indicated.  Additional history: as documented    Patient Active Problem List   Diagnosis     Chronic pain of both shoulders     Complex regional pain syndrome type 1 of right lower extremity     Bilateral carpal tunnel syndrome     Neck pain     No past surgical history on file.    Social History   Substance Use Topics     Smoking status: Former Smoker     Packs/day: 1.00     Smokeless tobacco: Current User     Types: Chew     Alcohol use No     No family history on file.      Current Outpatient Prescriptions   Medication Sig Dispense Refill     ascorbic acid (VITAMIN C) 1000 MG TABS Take 500 mg by mouth       celecoxib (CELEBREX) 200 MG capsule Take 200 mg by mouth       gabapentin (NEURONTIN) 300 MG capsule Take 900 mg by mouth       GLUCOSAMINE SULFATE PO        Multiple Vitamins-Minerals (MULTI-VITAMIN/MINERALS) TABS Take 1 tablet by mouth       Omega-3 Fatty Acids (FISH OIL OMEGA-3 PO)        oxyCODONE (ROXICODONE) 5 MG immediate release tablet Take 1-2 tablets by mouth every 4 hours if needed for Pain (max 8 per day)   USE  "DATES:06/0116-6/15/2016       zolpidem (AMBIEN) 5 MG tablet Take 5 mg by mouth nightly as needed       No Known Allergies  BP Readings from Last 3 Encounters:   10/26/18 132/76   06/14/18 123/79   04/26/18 161/89    Wt Readings from Last 3 Encounters:   10/26/18 206 lb (93.4 kg)   06/14/18 193 lb (87.5 kg)   04/26/18 194 lb 12.8 oz (88.4 kg)                    Reviewed and updated as needed this visit by clinical staff  Allergies       Reviewed and updated as needed this visit by Provider         ROS:  Constitutional, HEENT, cardiovascular, pulmonary, gi and gu systems are negative, except as otherwise noted.    OBJECTIVE:     /76  Pulse 117  Temp 98.2  F (36.8  C) (Oral)  Resp 16  Ht 5' 9\" (1.753 m)  Wt 206 lb (93.4 kg)  SpO2 96%  BMI 30.42 kg/m2  Body mass index is 30.42 kg/(m^2).  GENERAL: healthy, alert and no distress  Neck: Decreased range of motion  Shoulders. full range of motion. Pain with FF over head.   5/5 strength. Positive Neer and celestin.   Scapular asymmetry on right with range of motion.   Positive tinel and Phalen jose luis. 4/5  strength.     Diagnostic Test Results:  No results found for this or any previous visit (from the past 24 hour(s)).  X-ray jose luis shoulders: neg. pending radiology   ASSESSMENT/PLAN:         ICD-10-CM    1. Chronic pain of both shoulders M25.511 XR Shoulder Left G/E 3 Views    G89.29 XR Shoulder Right G/E 3 Views    M25.512 [facility] PT, HAND, AND CHIROPRACTIC REFERRAL   2. Neck pain M54.2 [facility] PT, HAND, AND CHIROPRACTIC REFERRAL   3. Bilateral carpal tunnel syndrome G56.03 WRIST BRACE,EXTEN.CONTROL   4. Complex regional pain syndrome type 1 of right lower extremity G90.521    1-2start PHYSICAL THERAPY for shoulder and neck.   3. Start wearing braces at night for 4 wks. Follow up  In not improving  4. con't care with pain clinic. Advised he get outside orders from the clinic to order the labs needed.     Zeyad Ferreira PA-C  [facility]  "

## 2019-11-07 ENCOUNTER — HEALTH MAINTENANCE LETTER (OUTPATIENT)
Age: 44
End: 2019-11-07

## 2020-10-23 ENCOUNTER — TELEPHONE (OUTPATIENT)
Dept: FAMILY MEDICINE | Facility: CLINIC | Age: 45
End: 2020-10-23

## 2020-10-23 NOTE — TELEPHONE ENCOUNTER
To Provider,  Please see the message from the patient and advise. Route back to the team.  Thank you,  Saniya Steel TC

## 2020-10-23 NOTE — TELEPHONE ENCOUNTER
Patient states he would like to schedule an appointment with you or any provider to richard a boil on his back.  Please call to schedule.    Thank you.

## 2020-10-23 NOTE — TELEPHONE ENCOUNTER
I just called patient but there was no answer.    If he has an acute painful boil, he should be asked to go to urgent care. Otherwise he should be offered an appointment as soon as possible.    Sandro Israel M.D.

## 2020-10-26 NOTE — PROGRESS NOTES
"Subjective     Carloz Nicolas is a 45 year old male who presents to clinic today for the following health issues:    HPI           Patient is here for concern of cyst on the back present for about 3 to 4 years.  Per patient it has been growing over the past 6 months, he is here today because of the cyst has been uncomfortable over the past 6 months.  No fever or chills  Patient also report left ear plugging sensation.  He states he his ear ruptured while he was on a flight last year, since then he has been feeling plugged sensation.  No hearing loss    Review of Systems   Constitutional, HEENT, cardiovascular, pulmonary, gi and gu systems are negative, except as otherwise noted.      Objective    /78   Pulse 84   Temp 97.9  F (36.6  C) (Tympanic)   Ht 1.778 m (5' 10\")   Wt 91.6 kg (202 lb)   SpO2 99%   BMI 28.98 kg/m    Body mass index is 28.98 kg/m .  Physical Exam  Vitals signs and nursing note reviewed.   Constitutional:       General: He is not in acute distress.     Appearance: Normal appearance. He is not ill-appearing, toxic-appearing or diaphoretic.   HENT:      Head: Normocephalic and atraumatic.      Right Ear: Tympanic membrane, ear canal and external ear normal. There is no impacted cerumen.      Left Ear: Tympanic membrane, ear canal and external ear normal. There is no impacted cerumen.   Musculoskeletal:        Back:    Neurological:      Mental Status: He is alert.                    Assessment & Plan     Sebaceous cyst   I had a long discussion with the patient about his condition , diagnosis treatment options. We discussed diffenetial diagnosis, and expected course.  The cyst present for 3 to 4 years now he has been having discomfort over the past 6 months.  No signs of infection noted on exam.  No erythema, no tenderness no discharge.  Patient has no fever or chills.  Will refer to general surgery given the location next to lumbar spines and old surgical scar.  Patient told me there is " "leads in this area connected to spinal cord stimulator for chronic back pain      - GENERAL SURG ADULT REFERRAL; Future    Dysfunction of left eustachian tube  Discussed with patient his concern.  Symptoms most likely to eustachian tube dysfunction.  Advised to use Zyrtec and Flonase.  He also can use Afrin nasal spray for 1 to 2 days only then he can stop.  If no improvement will refer him to ENT for further pressure management    Need for vaccination  Tdap vaccine given  - 1st  Administration  [[id number]]     BMI:   Estimated body mass index is 28.98 kg/m  as calculated from the following:    Height as of this encounter: 1.778 m (5' 10\").    Weight as of this encounter: 91.6 kg (202 lb).                Return in about 3 months (around 1/27/2021).  Patient verbalized understanding and agreed on the plan of care.  All questions answered.  Hallie Hi MD  [facility]    "

## 2020-10-26 NOTE — TELEPHONE ENCOUNTER
Pt states it is not very painful.  He would like it evaluated and drained or removed in one appointment if possible.  Appointment made.  Cindi Ham BSN, RN

## 2020-10-27 ENCOUNTER — OFFICE VISIT (OUTPATIENT)
Dept: FAMILY MEDICINE | Facility: CLINIC | Age: 45
End: 2020-10-27
Payer: COMMERCIAL

## 2020-10-27 VITALS
SYSTOLIC BLOOD PRESSURE: 130 MMHG | BODY MASS INDEX: 28.92 KG/M2 | HEIGHT: 70 IN | TEMPERATURE: 97.9 F | OXYGEN SATURATION: 99 % | WEIGHT: 202 LBS | DIASTOLIC BLOOD PRESSURE: 78 MMHG | HEART RATE: 84 BPM

## 2020-10-27 DIAGNOSIS — Z23 NEED FOR VACCINATION: ICD-10-CM

## 2020-10-27 DIAGNOSIS — H69.92 DYSFUNCTION OF LEFT EUSTACHIAN TUBE: ICD-10-CM

## 2020-10-27 DIAGNOSIS — L72.3 SEBACEOUS CYST: Primary | ICD-10-CM

## 2020-10-27 PROCEDURE — 90471 IMMUNIZATION ADMIN: CPT | Performed by: FAMILY MEDICINE

## 2020-10-27 PROCEDURE — 99213 OFFICE O/P EST LOW 20 MIN: CPT | Mod: 25 | Performed by: FAMILY MEDICINE

## 2020-10-27 PROCEDURE — 90715 TDAP VACCINE 7 YRS/> IM: CPT | Performed by: FAMILY MEDICINE

## 2020-10-27 RX ORDER — DULOXETIN HYDROCHLORIDE 60 MG/1
60 CAPSULE, DELAYED RELEASE ORAL DAILY
COMMUNITY
Start: 2019-04-23

## 2020-10-27 ASSESSMENT — MIFFLIN-ST. JEOR: SCORE: 1807.52

## 2020-10-27 NOTE — NURSING NOTE
Prior to immunization administration, verified patients identity using patient s name and date of birth. Please see Immunization Activity for additional information.     Screening Questionnaire for Adult Immunization    Are you sick today?   No   Do you have allergies to medications, food, a vaccine component or latex?   No   Have you ever had a serious reaction after receiving a vaccination?   No   Do you have a long-term health problem with heart, lung, kidney, or metabolic disease (e.g., diabetes), asthma, a blood disorder, no spleen, complement component deficiency, a cochlear implant, or a spinal fluid leak?  Are you on long-term aspirin therapy?   No   Do you have cancer, leukemia, HIV/AIDS, or any other immune system problem?   No   Do you have a parent, brother, or sister with an immune system problem?   No   In the past 3 months, have you taken medications that affect  your immune system, such as prednisone, other steroids, or anticancer drugs; drugs for the treatment of rheumatoid arthritis, Crohn s disease, or psoriasis; or have you had radiation treatments?   Yes   Have you had a seizure, or a brain or other nervous system problem?   No   During the past year, have you received a transfusion of blood or blood    products, or been given immune (gamma) globulin or antiviral drug?   No   For women: Are you pregnant or is there a chance you could become       pregnant during the next month?   No   Have you received any vaccinations in the past 4 weeks?   No     Immunization questionnaire was positive for at least one answer.  Notified Dr. Hi.        Per orders of Dr. Hi, injection of tdap given by Consuelo Hinton MA. Patient instructed to remain in clinic for 15 minutes afterwards, and to report any adverse reaction to me immediately.       Screening performed by Consuelo Hinton MA on 10/27/2020 at 11:51 AM.

## 2020-11-02 ENCOUNTER — OFFICE VISIT (OUTPATIENT)
Dept: SURGERY | Facility: CLINIC | Age: 45
End: 2020-11-02
Payer: COMMERCIAL

## 2020-11-02 VITALS
DIASTOLIC BLOOD PRESSURE: 77 MMHG | HEART RATE: 105 BPM | SYSTOLIC BLOOD PRESSURE: 126 MMHG | BODY MASS INDEX: 29.13 KG/M2 | WEIGHT: 203 LBS

## 2020-11-02 DIAGNOSIS — L72.3 SEBACEOUS CYST: Primary | ICD-10-CM

## 2020-11-02 DIAGNOSIS — Z20.822 ENCOUNTER FOR LABORATORY TESTING FOR COVID-19 VIRUS: ICD-10-CM

## 2020-11-02 PROCEDURE — 99203 OFFICE O/P NEW LOW 30 MIN: CPT | Performed by: SURGERY

## 2020-11-02 NOTE — LETTER
11/2/2020         RE: Carloz Nicolas  [address] [place] [id number]        Dear Colleague,    Thank you for referring your patient, Carloz Nicolas, to the [facility]. Please see a copy of my visit note below.    Patient seen in consultation for cyst on back by Hallie Hi    HPI:  Patient is a 45 year old male  with complaints of enlarging cyst on back, slowly over time, more in the last year  The patient noticed the symptoms about 3-4+ years ago.    Getting more uncomfortable. No previous issues with infection or drainage  Now feeling like is pushing on things  Has a spinal stimulator placed about 3 years ago. One of the incisions that he thinks was used to pass leads is near this spot.  Thinks he had the lesion prior to the implant but can't recall for sure as was similar time frame.  nothing makes the episode better.  Patient has not family history of similar problems  Patient had one on neck that was drained about 7 years ago    Review Of Systems    Skin: as above  Ears/Nose/Throat: negative  Respiratory: No shortness of breath, dyspnea on exertion, cough, or hemoptysis  Cardiovascular: negative  Gastrointestinal: negative  Genitourinary: negative  Musculoskeletal: back pain  Neurologic: negative  Hematologic/Lymphatic/Immunologic: negative  Endocrine: negative      History reviewed. No pertinent past medical history.   Patient Active Problem List   Diagnosis     Chronic pain of both shoulders     Complex regional pain syndrome type 1 of right lower extremity     Bilateral carpal tunnel syndrome     Neck pain   CRPS also involves back- one lead from stimulator to back, one to right leg    History reviewed. No pertinent surgical history.   Spinal stimulator  Has had ESWL for kidney stone  Radio frequency ablations    Social History     Socioeconomic History     Marital status:      Spouse name: Not on file     Number of children: Not on file     Years of education: Not on  file     Highest education level: Not on file   Occupational History     Not on file   Social Needs     Financial resource strain: Not on file     Food insecurity     Worry: Not on file     Inability: Not on file     Transportation needs     Medical: Not on file     Non-medical: Not on file   Tobacco Use     Smoking status: Former Smoker     Packs/day: 1.00     Smokeless tobacco: Current User     Types: Chew   Substance and Sexual Activity     Alcohol use: No     Drug use: No     Sexual activity: Not on file   Lifestyle     Physical activity     Days per week: Not on file     Minutes per session: Not on file     Stress: Not on file   Relationships     Social connections     Talks on phone: Not on file     Gets together: Not on file     Attends [employer] service: Not on file     Active member of club or organization: Not on file     Attends meetings of clubs or organizations: Not on file     Relationship status: Not on file     Intimate partner violence     Fear of current or ex partner: Not on file     Emotionally abused: Not on file     Physically abused: Not on file     Forced sexual activity: Not on file   Other Topics Concern     Parent/sibling w/ CABG, MI or angioplasty before 65F 55M? Not Asked   Social History Narrative     Not on file       Current Outpatient Medications   Medication Sig Dispense Refill     ascorbic acid (VITAMIN C) 1000 MG TABS Take 500 mg by mouth       Cholecalciferol 10 MCG (400 UNIT) CAPS Take 400 Units by mouth daily       DULoxetine (CYMBALTA) 60 MG capsule Take 60 mg by mouth daily       Multiple Vitamins-Minerals (MULTI-VITAMIN/MINERALS) TABS Take 1 tablet by mouth       Omega-3 Fatty Acids (FISH OIL OMEGA-3 PO)        oxyCODONE (ROXICODONE) 5 MG immediate release tablet Take 1-2 tablets by mouth every 4 hours if needed for Pain (max 8 per day)   USE DATES:06/0116-6/15/2016       zolpidem (AMBIEN) 5 MG tablet Take 5 mg by mouth nightly as needed       celecoxib (CELEBREX) 200 MG  capsule Take 200 mg by mouth       gabapentin (NEURONTIN) 300 MG capsule Take 900 mg by mouth       GLUCOSAMINE SULFATE PO      not taking the gabapentin anymore    Medications and history reviewed    Physical exam:  Vitals: /77   Pulse 105   Wt 92.1 kg (203 lb)   BMI 29.13 kg/m    BMI= Body mass index is 29.13 kg/m .    Constitutional: healthy, alert and no distress  Head: Normocephalic. No masses, lesions, tenderness or abnormalities  Cardiovascular: negative, PMI normal. No lifts, heaves, or thrills. RRR. No murmurs, clicks gallops or rub  Respiratory: negative, Percussion normal. Good diaphragmatic excursion. Lungs clear  Gastrointestinal: Abdomen soft, non-tender. BS normal. No masses, organomegaly  : Deferred  Musculoskeletal: extremities normal- no gross deformities noted, gait normal and normal muscle tone  Skin: Lower mid back just to the left of the spine and midline is an approximately 3-1/2 cm diameter round sebaceous cyst with central punctum.  No evidence of cellulitis or infection.  No drainage.  This is just to the left of a vertical midline scar from his spinal stimulator implantation.  The device itself is located in a pocket in the lower left back near her waistline  Psychiatric: mentation appears normal and affect normal/bright  Patient able to get up on table without difficulty.    Assessment:     ICD-10-CM    1. Sebaceous cyst  L72.3 Case Request: EXCISION, LESION, BENIGN, TORSO OR EXTREMITY, 3.1 CM TO 4.0 CM IN DIAMETER   2. Encounter for laboratory testing for COVID-19 virus  Z20.828 Asymptomatic COVID-19 Virus (Coronavirus) by PCR     Plan: Good size sebaceous cyst, no apparent rupture.  Complicated by nearby scar and presumed lead or leads from his spinal stimulator.  Due to both the size and the previous surgery would plan for excision with sedation in the OR.  Patient is agreeable to this.  Discussed risks of bleeding, infection, injury to any lead or other stimulator part in  the area the hopefully this would be a minor risk.  Also discussed chance of cyst like this recurring.  Plan for excision rather than a drainage as that he has the lesser chance of recurrence as it removes the whole cyst wall.  We will work on scheduling    Alberto Lopez MD        Again, thank you for allowing me to participate in the care of your patient.        Sincerely,        Alberto Lopez MD

## 2020-11-02 NOTE — PROGRESS NOTES
Patient seen in consultation for cyst on back by Hallie Hi    HPI:  Patient is a 45 year old male  with complaints of enlarging cyst on back, slowly over time, more in the last year  The patient noticed the symptoms about 3-4+ years ago.    Getting more uncomfortable. No previous issues with infection or drainage  Now feeling like is pushing on things  Has a spinal stimulator placed about 3 years ago. One of the incisions that he thinks was used to pass leads is near this spot.  Thinks he had the lesion prior to the implant but can't recall for sure as was similar time frame.  nothing makes the episode better.  Patient has not family history of similar problems  Patient had one on neck that was drained about 7 years ago    Review Of Systems    Skin: as above  Ears/Nose/Throat: negative  Respiratory: No shortness of breath, dyspnea on exertion, cough, or hemoptysis  Cardiovascular: negative  Gastrointestinal: negative  Genitourinary: negative  Musculoskeletal: back pain  Neurologic: negative  Hematologic/Lymphatic/Immunologic: negative  Endocrine: negative      History reviewed. No pertinent past medical history.   Patient Active Problem List   Diagnosis     Chronic pain of both shoulders     Complex regional pain syndrome type 1 of right lower extremity     Bilateral carpal tunnel syndrome     Neck pain   CRPS also involves back- one lead from stimulator to back, one to right leg    History reviewed. No pertinent surgical history.   Spinal stimulator  Has had ESWL for kidney stone  Radio frequency ablations    Social History     Socioeconomic History     Marital status:      Spouse name: Not on file     Number of children: Not on file     Years of education: Not on file     Highest education level: Not on file   Occupational History     Not on file   Social Needs     Financial resource strain: Not on file     Food insecurity     Worry: Not on file     Inability: Not on file     Transportation needs      Medical: Not on file     Non-medical: Not on file   Tobacco Use     Smoking status: Former Smoker     Packs/day: 1.00     Smokeless tobacco: Current User     Types: Chew   Substance and Sexual Activity     Alcohol use: No     Drug use: No     Sexual activity: Not on file   Lifestyle     Physical activity     Days per week: Not on file     Minutes per session: Not on file     Stress: Not on file   Relationships     Social connections     Talks on phone: Not on file     Gets together: Not on file     Attends Christianity service: Not on file     Active member of club or organization: Not on file     Attends meetings of clubs or organizations: Not on file     Relationship status: Not on file     Intimate partner violence     Fear of current or ex partner: Not on file     Emotionally abused: Not on file     Physically abused: Not on file     Forced sexual activity: Not on file   Other Topics Concern     Parent/sibling w/ CABG, MI or angioplasty before 65F 55M? Not Asked   Social History Narrative     Not on file       Current Outpatient Medications   Medication Sig Dispense Refill     ascorbic acid (VITAMIN C) 1000 MG TABS Take 500 mg by mouth       Cholecalciferol 10 MCG (400 UNIT) CAPS Take 400 Units by mouth daily       DULoxetine (CYMBALTA) 60 MG capsule Take 60 mg by mouth daily       Multiple Vitamins-Minerals (MULTI-VITAMIN/MINERALS) TABS Take 1 tablet by mouth       Omega-3 Fatty Acids (FISH OIL OMEGA-3 PO)        oxyCODONE (ROXICODONE) 5 MG immediate release tablet Take 1-2 tablets by mouth every 4 hours if needed for Pain (max 8 per day)   USE DATES:06/0116-6/15/2016       zolpidem (AMBIEN) 5 MG tablet Take 5 mg by mouth nightly as needed       celecoxib (CELEBREX) 200 MG capsule Take 200 mg by mouth       gabapentin (NEURONTIN) 300 MG capsule Take 900 mg by mouth       GLUCOSAMINE SULFATE PO      not taking the gabapentin anymore    Medications and history reviewed    Physical exam:  Vitals: /77   Pulse  105   Wt 92.1 kg (203 lb)   BMI 29.13 kg/m    BMI= Body mass index is 29.13 kg/m .    Constitutional: healthy, alert and no distress  Head: Normocephalic. No masses, lesions, tenderness or abnormalities  Cardiovascular: negative, PMI normal. No lifts, heaves, or thrills. RRR. No murmurs, clicks gallops or rub  Respiratory: negative, Percussion normal. Good diaphragmatic excursion. Lungs clear  Gastrointestinal: Abdomen soft, non-tender. BS normal. No masses, organomegaly  : Deferred  Musculoskeletal: extremities normal- no gross deformities noted, gait normal and normal muscle tone  Skin: Lower mid back just to the left of the spine and midline is an approximately 3-1/2 cm diameter round sebaceous cyst with central punctum.  No evidence of cellulitis or infection.  No drainage.  This is just to the left of a vertical midline scar from his spinal stimulator implantation.  The device itself is located in a pocket in the lower left back near her waistline  Psychiatric: mentation appears normal and affect normal/bright  Patient able to get up on table without difficulty.    Assessment:     ICD-10-CM    1. Sebaceous cyst  L72.3 Case Request: EXCISION, LESION, BENIGN, TORSO OR EXTREMITY, 3.1 CM TO 4.0 CM IN DIAMETER   2. Encounter for laboratory testing for COVID-19 virus  Z20.828 Asymptomatic COVID-19 Virus (Coronavirus) by PCR     Plan: Good size sebaceous cyst, no apparent rupture.  Complicated by nearby scar and presumed lead or leads from his spinal stimulator.  Due to both the size and the previous surgery would plan for excision with sedation in the OR.  Patient is agreeable to this.  Discussed risks of bleeding, infection, injury to any lead or other stimulator part in the area the hopefully this would be a minor risk.  Also discussed chance of cyst like this recurring.  Plan for excision rather than a drainage as that he has the lesser chance of recurrence as it removes the whole cyst wall.  We will work on  scheduling    Alberto Lopez MD

## 2020-11-04 ENCOUNTER — TELEPHONE (OUTPATIENT)
Dept: SURGERY | Facility: CLINIC | Age: 45
End: 2020-11-04

## 2020-11-04 PROBLEM — L72.3 SEBACEOUS CYST: Status: ACTIVE | Noted: 2020-11-04

## 2020-11-04 NOTE — TELEPHONE ENCOUNTER
Type of surgery: excision,lesion,benign,torso or extremity, 3.1 CM to 4.0 CM in diameter (n/a)  CPT 16620   Sebaceous cyst L72.3    Location of surgery: [facility]  Date and time of surgery: 12-2-20  TBD  Surgeon: Dr Lopez  Pre-Op Appt Date: 11-18-20  Post-Op Appt Date: 12-14-20   Packet sent out: Yes  Pre-cert/Authorization completed:  No prior auth required per [employer] online list.  Date: 11/4/20    Bella Shaw  Prior Authorization Dept  [phone]

## 2020-11-06 DIAGNOSIS — Z11.59 ENCOUNTER FOR SCREENING FOR OTHER VIRAL DISEASES: Primary | ICD-10-CM

## 2020-11-18 ENCOUNTER — OFFICE VISIT (OUTPATIENT)
Dept: FAMILY MEDICINE | Facility: CLINIC | Age: 45
End: 2020-11-18
Payer: COMMERCIAL

## 2020-11-18 VITALS
TEMPERATURE: 98.4 F | SYSTOLIC BLOOD PRESSURE: 122 MMHG | OXYGEN SATURATION: 100 % | HEIGHT: 70 IN | DIASTOLIC BLOOD PRESSURE: 74 MMHG | WEIGHT: 206 LBS | HEART RATE: 82 BPM | BODY MASS INDEX: 29.49 KG/M2

## 2020-11-18 DIAGNOSIS — Z01.818 PRE-OPERATIVE GENERAL PHYSICAL EXAMINATION: Primary | ICD-10-CM

## 2020-11-18 DIAGNOSIS — H69.92 DYSFUNCTION OF LEFT EUSTACHIAN TUBE: ICD-10-CM

## 2020-11-18 DIAGNOSIS — L72.3 SEBACEOUS CYST: ICD-10-CM

## 2020-11-18 PROBLEM — M54.16 LUMBAR RADICULAR PAIN: Status: ACTIVE | Noted: 2017-09-27

## 2020-11-18 PROBLEM — Z79.899 CONTROLLED SUBSTANCE AGREEMENT SIGNED: Status: ACTIVE | Noted: 2020-06-19

## 2020-11-18 PROBLEM — M47.816 FACET DEGENERATION OF LUMBAR REGION: Status: ACTIVE | Noted: 2018-02-21

## 2020-11-18 LAB
CREAT SERPL-MCNC: 1.22 MG/DL (ref 0.66–1.25)
GFR SERPL CREATININE-BSD FRML MDRD: 71 ML/MIN/{1.73_M2}
HGB BLD-MCNC: 16.6 G/DL (ref 13.3–17.7)
POTASSIUM SERPL-SCNC: 4.2 MMOL/L (ref 3.4–5.3)

## 2020-11-18 PROCEDURE — 85018 HEMOGLOBIN: CPT | Performed by: NURSE PRACTITIONER

## 2020-11-18 PROCEDURE — 82565 ASSAY OF CREATININE: CPT | Performed by: NURSE PRACTITIONER

## 2020-11-18 PROCEDURE — 99214 OFFICE O/P EST MOD 30 MIN: CPT | Performed by: NURSE PRACTITIONER

## 2020-11-18 PROCEDURE — 84132 ASSAY OF SERUM POTASSIUM: CPT | Performed by: NURSE PRACTITIONER

## 2020-11-18 PROCEDURE — 36415 COLL VENOUS BLD VENIPUNCTURE: CPT | Performed by: NURSE PRACTITIONER

## 2020-11-18 ASSESSMENT — MIFFLIN-ST. JEOR: SCORE: 1825.66

## 2020-11-18 NOTE — PATIENT INSTRUCTIONS
"Okay to continue current medications up until surgery.     Hold any ibuprofen for 7 days prior to surgery.       Referral to ENT for your left ear.       Preparing for Your Surgery  Getting started  A surgery nurse will call you to review your health history and instructions. They will give you an arrival time based on your scheduled surgery time.  Please be ready to share the following:    Your doctor's clinic name and phone number    Your medical, surgical and anesthesia history    A list of allergies and sensitivities    A list of medicines, including herbal treatments and over-the-counter drugs    Whether the patient has a legal guardian (ask how to send us the papers in advance)  If your child is having surgery, please ask for a copy of Preparing for Your Child's Surgery.    Preparing for surgery    Within 30 days of surgery: Have an exam at your family clinic (primary care clinic), or go to a pre-operative clinic. This exam is called a \"History and Physical,\" or H&P.    At your H&P exam, talk to your care team about all medicines you take. If you need to stop any medicines before surgery, ask when to start taking them again.  ? We do this for your safety. Many medicines can make you bleed too much during surgery. Some change how well surgery (anesthesia) drugs work.    Call your insurance company to see what it will and won't pay for. Ask if they need to pre-approve the surgery. (If no insurance, call [phone].)    Call your surgeon's clinic if there's any change in your health. This includes signs of a cold or flu (sore throat, runny nose, cough, rash, fever). It also includes a scrape or scratch near the surgery site.    If you have questions on the day of surgery, call your surgery center.  Eating and drinking guidelines  For your safety: Unless your surgeon tells you otherwise, follow the guidelines below.    Eat and drink as usual until 8 hours before surgery. After that, no food or milk.    Drink " clear liquids until 2 hours before surgery. These are liquids you can see through, like water, Gatorade and Propel Water. You may also have black coffee and tea (no cream or milk).    Nothing by mouth within 2 hours of surgery. This includes gum, candy and breath mints.    Stop alcohol the midnight before surgery.    If your family clinic tells you to take medicine on the morning of surgery, it's okay to take it with a sip of water.  Preventing infection    Shower or bathe the night before and morning of your surgery. Follow the instructions your clinic gave you. (If no instructions, use regular soap.)    Don't shave or clip hair near your surgery site. This can lead to skin infection.    Don't smoke the morning of surgery. Smoking increases the risk of infection. You may chew nicotine gum up to 2 hours before surgery. A nicotine patch is okay.  ? Note: Some surgeries require you to completely quit smoking and nicotine. Check with your surgeon.    Your care team will make every effort to keep you safe from infection. We will:  ? Clean our hands often with soap and water (or an alcohol-based hand rub).  ? Clean the skin at your surgery site with a special soap that kills germs. We'll also remove hair from the site as needed.  ? Wear special hair covers, masks, gowns and gloves during surgery.  ? Give antibiotic medicine, if prescribed. Not all surgeries need antibiotics.  What to bring on the day of surgery    Photo ID and insurance card    Copy of your health care directive, if you have one    Glasses and hearing aides (bring cases)  ? You can't wear contacts during surgery    Inhaler and eye drops, if you use them (tell us about these when you arrive)    CPAP machine or breathing device, if you use them    A few personal items, if spending the night    If you have . . .  ? A pacemaker or ICD (cardiac defibrillator): Bring the ID card.  ? An implanted stimulator: Bring the remote control.  ? A legal guardian: Bring  a copy of the certified (court-stamped) guardianship papers.  Please remove any jewelry, including body piercings. Leave jewelry and other valuables at home.  If you're going home the day of surgery  Important: If you don't follow the rules below, we must cancel your surgery.     Arrange for someone to drive you home after surgery. You may not drive, take a taxi or take public transportation by yourself (unless you'll have local anesthesia only).    Arrange for a responsible adult to stay with you overnight. If you don't, we may keep you in the hospital overnight, and you may need to pay the costs yourself.  Questions?   If you have any questions for your care team, list them here: _________________________________________________________________________________________________________________________________________________________________________________________________________________________________________________________________________________________________________________________  For informational purposes only. Not to replace the advice of your health care provider. Copyright   3697-4231 [facility]. All rights reserved. Clinically reviewed by Tonya Ruano MD. [employer] [id number] - REV 07/19.

## 2020-11-18 NOTE — PROGRESS NOTES
[facility]  [address] [facility] [address]  Phone: [phone]  Primary Provider: Hallie Hi  Pre-op Performing Provider: JIM WOODS    PREOPERATIVE EVALUATION:  Today's date: 11/18/2020    Carloz Nicolas is a 45 year old male who presents for a preoperative evaluation.    Surgical Information:  Surgery/Procedure: EXCISION, MASS, TORSO  Surgery Location: [facility]  Surgeon: Alberto Lopez MD  Surgery Date: 12/2/20  Time of Surgery: 1230pm  Where patient plans to recover: At home with family  Fax number for surgical facility: Note does not need to be faxed, will be available electronically in Epic.    Type of Anesthesia Anticipated: Combined MAC with Local    Subjective     HPI related to upcoming procedure: Carloz Nicolas is a 44 yo male with a PMH significant for chronic back pain and hyperlipidemia who presents for preoperative physical for planned excision of sebaceous cyst on his back.  Patient reports he has been feeling well.  He denies any recent illness or fevers.  He denies any past issues with anesthesia.    Patient does report ongoing symptoms in his left ear, feels plugged.  Has felt this way since he was on a plane in February 2020.  He saw another provider for this on 10/27/2020, there were no signs of infection.  He was diagnosed with eustachian tube dysfunction and told to try Flonase and Zyrtec.  Patient reports tried recommended medications but his symptoms persist.  He is asking for referral to ENT today.    Preop Questions 11/18/2020   1. Have you ever had a heart attack or stroke? No   2. Have you ever had surgery on your heart or blood vessels, such as a stent placement, a coronary artery bypass, or surgery on an artery in your head, neck, heart, or legs? No   3. Do you have chest pain with activity? No   4. Do you have a history of  heart failure? No   5. Do you currently have a cold, bronchitis or symptoms of other infection? No   6.  Do you have a cough, shortness of breath, or wheezing? No   7. Do you or anyone in your family have previous history of blood clots? No   8. Do you or does anyone in your family have a serious bleeding problem such as prolonged bleeding following surgeries or cuts? No   9. Have you ever had problems with anemia or been told to take iron pills? No   10. Have you had any abnormal blood loss such as black, tarry or bloody stools? No   11. Have you ever had a blood transfusion? No   12. Are you willing to have a blood transfusion if it is medically needed before, during, or after your surgery? Yes   13. Have you or any of your relatives ever had problems with anesthesia? No   14. Do you have sleep apnea, excessive snoring or daytime drowsiness? No   15. Do you have any artifical heart valves or other implanted medical devices like a pacemaker, defibrillator, or continuous glucose monitor? No   16. Do you have artificial joints? No   17. Are you allergic to latex? No     Health Care Directive:  Patient does not have a Health Care Directive or Living Will: Discussed advance care planning with patient; however, patient declined at this time.    Preoperative Review of :   reviewed - controlled substances reflected in medication list.      Status of Chronic Conditions:  See problem list for active medical problems.  Problems all longstanding and stable, except as noted/documented.  See ROS for pertinent symptoms related to these conditions.      Review of Systems  CONSTITUTIONAL: NEGATIVE for fever, chills, change in weight  INTEGUMENTARY/SKIN: NEGATIVE for worrisome rashes, moles or lesions  EYES: NEGATIVE for vision changes or irritation  ENT/MOUTH: NEGATIVE for ear, mouth and throat problems  RESP: NEGATIVE for significant cough or SOB  BREAST: NEGATIVE for masses, tenderness or discharge  CV: NEGATIVE for chest pain, palpitations or peripheral edema  GI: NEGATIVE for nausea, abdominal pain, heartburn, or change in  bowel habits  : NEGATIVE for frequency, dysuria, or hematuria  MUSCULOSKELETAL: NEGATIVE for significant arthralgias or myalgia  NEURO: NEGATIVE for weakness, dizziness or paresthesias  ENDOCRINE: NEGATIVE for temperature intolerance, skin/hair changes  HEME: NEGATIVE for bleeding problems  PSYCHIATRIC: NEGATIVE for changes in mood or affect    Patient Active Problem List    Diagnosis Date Noted     Sebaceous cyst 11/04/2020     Priority: Medium     Added automatically from request for surgery [id number]       Controlled substance agreement signed 06/19/2020     Priority: Medium     Chronic pain of both shoulders 10/26/2018     Priority: Medium     Complex regional pain syndrome type 1 of right lower extremity 10/26/2018     Priority: Medium     Bilateral carpal tunnel syndrome 10/26/2018     Priority: Medium     Neck pain 10/26/2018     Priority: Medium     Facet degeneration of lumbar region 02/21/2018     Priority: Medium     Lumbar radicular pain 09/27/2017     Priority: Medium     Right flank pain 12/17/2015     Priority: Medium     Right ureteral stone 12/17/2015     Priority: Medium     Back pain at L4-L5 level 09/30/2015     Priority: Medium     Drug-induced erectile dysfunction 09/29/2015     Priority: Medium     Epicondylitis, lateral, right 10/14/2014     Priority: Medium     Post herpetic neuralgia 01/28/2013     Priority: Medium     Paresthesia of left arm 10/29/2012     Priority: Medium     Solitary pulmonary nodule 10/29/2012     Priority: Medium     Thrombocytopenia, unspecified (H) 04/22/2012     Priority: Medium     Chicken pox pneumonia 04/21/2012     Priority: Medium     Hyperlipidemia 11/01/2010     Priority: Medium      History reviewed. No pertinent past medical history.  History reviewed. No pertinent surgical history.  Current Outpatient Medications   Medication Sig Dispense Refill     ascorbic acid (VITAMIN C) 1000 MG TABS Take 500 mg by mouth       Cholecalciferol 10 MCG (400 UNIT) CAPS  "Take 400 Units by mouth daily       DULoxetine (CYMBALTA) 60 MG capsule Take 60 mg by mouth daily       Omega-3 Fatty Acids (FISH OIL OMEGA-3 PO)        oxyCODONE (ROXICODONE) 5 MG immediate release tablet Take 1-2 tablets by mouth every 4 hours if needed for Pain (max 8 per day)   USE DATES:06/0116-6/15/2016       zolpidem (AMBIEN) 5 MG tablet Take 5 mg by mouth nightly as needed         No Known Allergies     Social History     Tobacco Use     Smoking status: Former Smoker     Packs/day: 1.00     Smokeless tobacco: Current User     Types: Chew   Substance Use Topics     Alcohol use: No       History   Drug Use No         Objective     /74   Pulse 82   Temp 98.4  F (36.9  C) (Oral)   Ht 1.778 m (5' 10\")   Wt 93.4 kg (206 lb)   SpO2 100%   BMI 29.56 kg/m      Physical Exam    GENERAL APPEARANCE: healthy, alert and no distress     EYES: EOMI,  PERRL     HENT: ear canals and TM's normal and nose and mouth without ulcers or lesions     NECK: no adenopathy, no asymmetry, masses, or scars and thyroid normal to palpation     RESP: lungs clear to auscultation - no rales, rhonchi or wheezes     CV: regular rates and rhythm, normal S1 S2, no S3 or S4 and no murmur, click or rub     ABDOMEN:  soft, nontender, no HSM or masses and bowel sounds normal     MS: extremities normal- no gross deformities noted, no evidence of inflammation in joints, FROM in all extremities.     SKIN: no suspicious lesions or rashes     NEURO: Normal strength and tone, sensory exam grossly normal, mentation intact and speech normal     PSYCH: mentation appears normal. and affect normal/bright     LYMPHATICS: No cervical adenopathy    No results for input(s): HGB, PLT, INR, NA, POTASSIUM, CR, A1C in the last 92316 hours.     Diagnostics:  Labs pending at this time.  Results will be reviewed when available.   No EKG required for low risk surgery (cataract, skin procedure, breast biopsy, etc).    Revised Cardiac Risk Index (RCRI):  The " patient has the following serious cardiovascular risks for perioperative complications:   - No serious cardiac risks = 0 points     RCRI Interpretation: 0 points: Class I (very low risk - 0.4% complication rate)       Assessment & Plan   The proposed surgical procedure is considered LOW risk.    Carloz was seen today for pre-op exam.    Diagnoses and all orders for this visit:    Pre-operative general physical examination  -     Hemoglobin  -     Creatinine  -     Potassium    Sebaceous cyst    Dysfunction of left eustachian tube  -     OTOLARYNGOLOGY REFERRAL      Risks and Recommendations:  The patient has the following additional risks and recommendations for perioperative complications:   - No identified additional risk factors other than previously addressed    Medication Instructions:  Patient is to take all scheduled medications on the day of surgery    RECOMMENDATION:  APPROVAL GIVEN to proceed with proposed procedure, without further diagnostic evaluation.    Signed Electronically by: Asha Lemos CNP    Copy of this evaluation report is provided to requesting physician.    Preop [facility] Preop Guidelines    Revised Cardiac Risk Index

## 2020-11-28 DIAGNOSIS — Z20.822 ENCOUNTER FOR LABORATORY TESTING FOR COVID-19 VIRUS: ICD-10-CM

## 2020-11-28 DIAGNOSIS — Z11.59 ENCOUNTER FOR SCREENING FOR OTHER VIRAL DISEASES: ICD-10-CM

## 2020-11-28 PROCEDURE — U0003 INFECTIOUS AGENT DETECTION BY NUCLEIC ACID (DNA OR RNA); SEVERE ACUTE RESPIRATORY SYNDROME CORONAVIRUS 2 (SARS-COV-2) (CORONAVIRUS DISEASE [COVID-19]), AMPLIFIED PROBE TECHNIQUE, MAKING USE OF HIGH THROUGHPUT TECHNOLOGIES AS DESCRIBED BY CMS-2020-01-R: HCPCS | Performed by: SURGERY

## 2020-11-29 ENCOUNTER — HEALTH MAINTENANCE LETTER (OUTPATIENT)
Age: 45
End: 2020-11-29

## 2020-11-30 LAB
SARS-COV-2 RNA SPEC QL NAA+PROBE: NOT DETECTED
SPECIMEN SOURCE: NORMAL

## 2020-12-01 ENCOUNTER — ANESTHESIA EVENT (OUTPATIENT)
Dept: SURGERY | Facility: AMBULATORY SURGERY CENTER | Age: 45
End: 2020-12-01

## 2020-12-02 ENCOUNTER — SURGERY (OUTPATIENT)
Age: 45
End: 2020-12-02
Payer: COMMERCIAL

## 2020-12-02 ENCOUNTER — ANESTHESIA (OUTPATIENT)
Dept: SURGERY | Facility: AMBULATORY SURGERY CENTER | Age: 45
End: 2020-12-02

## 2020-12-02 ENCOUNTER — HOSPITAL ENCOUNTER (OUTPATIENT)
Facility: AMBULATORY SURGERY CENTER | Age: 45
Discharge: HOME | End: 2020-12-02
Attending: SURGERY | Admitting: SURGERY
Payer: COMMERCIAL

## 2020-12-02 VITALS
RESPIRATION RATE: 18 BRPM | HEART RATE: 81 BPM | DIASTOLIC BLOOD PRESSURE: 57 MMHG | TEMPERATURE: 98 F | OXYGEN SATURATION: 91 % | SYSTOLIC BLOOD PRESSURE: 110 MMHG

## 2020-12-02 DIAGNOSIS — L72.3 SEBACEOUS CYST: ICD-10-CM

## 2020-12-02 PROCEDURE — 12031 INTMD RPR S/A/T/EXT 2.5 CM/<: CPT

## 2020-12-02 PROCEDURE — 88304 TISSUE EXAM BY PATHOLOGIST: CPT | Performed by: DERMATOLOGY

## 2020-12-02 PROCEDURE — G8916 PT W IV AB GIVEN ON TIME: HCPCS

## 2020-12-02 PROCEDURE — G8907 PT DOC NO EVENTS ON DISCHARG: HCPCS

## 2020-12-02 PROCEDURE — 12031 INTMD RPR S/A/T/EXT 2.5 CM/<: CPT | Performed by: SURGERY

## 2020-12-02 PROCEDURE — 11404 EXC TR-EXT B9+MARG 3.1-4 CM: CPT | Mod: 51 | Performed by: SURGERY

## 2020-12-02 PROCEDURE — 11404 EXC TR-EXT B9+MARG 3.1-4 CM: CPT

## 2020-12-02 RX ORDER — FENTANYL CITRATE 50 UG/ML
25-50 INJECTION, SOLUTION INTRAMUSCULAR; INTRAVENOUS EVERY 5 MIN PRN
Status: DISCONTINUED | OUTPATIENT
Start: 2020-12-02 | End: 2020-12-03 | Stop reason: HOSPADM

## 2020-12-02 RX ORDER — CEFAZOLIN SODIUM 2 G/100ML
2 INJECTION, SOLUTION INTRAVENOUS
Status: COMPLETED | OUTPATIENT
Start: 2020-12-02 | End: 2020-12-02

## 2020-12-02 RX ORDER — NALOXONE HYDROCHLORIDE 0.4 MG/ML
0.2 INJECTION, SOLUTION INTRAMUSCULAR; INTRAVENOUS; SUBCUTANEOUS
Status: DISCONTINUED | OUTPATIENT
Start: 2020-12-02 | End: 2020-12-03 | Stop reason: HOSPADM

## 2020-12-02 RX ORDER — OXYCODONE HYDROCHLORIDE 5 MG/1
5 TABLET ORAL EVERY 4 HOURS PRN
Status: DISCONTINUED | OUTPATIENT
Start: 2020-12-02 | End: 2020-12-03 | Stop reason: HOSPADM

## 2020-12-02 RX ORDER — NALOXONE HYDROCHLORIDE 0.4 MG/ML
0.4 INJECTION, SOLUTION INTRAMUSCULAR; INTRAVENOUS; SUBCUTANEOUS
Status: DISCONTINUED | OUTPATIENT
Start: 2020-12-02 | End: 2020-12-03 | Stop reason: HOSPADM

## 2020-12-02 RX ORDER — PROPOFOL 10 MG/ML
INJECTION, EMULSION INTRAVENOUS CONTINUOUS PRN
Status: DISCONTINUED | OUTPATIENT
Start: 2020-12-02 | End: 2020-12-02

## 2020-12-02 RX ORDER — PROPOFOL 10 MG/ML
INJECTION, EMULSION INTRAVENOUS PRN
Status: DISCONTINUED | OUTPATIENT
Start: 2020-12-02 | End: 2020-12-02

## 2020-12-02 RX ORDER — ONDANSETRON 2 MG/ML
4 INJECTION INTRAMUSCULAR; INTRAVENOUS EVERY 30 MIN PRN
Status: DISCONTINUED | OUTPATIENT
Start: 2020-12-02 | End: 2020-12-03 | Stop reason: HOSPADM

## 2020-12-02 RX ORDER — ACETAMINOPHEN 325 MG/1
975 TABLET ORAL ONCE
Status: COMPLETED | OUTPATIENT
Start: 2020-12-02 | End: 2020-12-02

## 2020-12-02 RX ORDER — BUPIVACAINE HYDROCHLORIDE AND EPINEPHRINE 5; 5 MG/ML; UG/ML
INJECTION, SOLUTION PERINEURAL PRN
Status: DISCONTINUED | OUTPATIENT
Start: 2020-12-02 | End: 2020-12-02 | Stop reason: HOSPADM

## 2020-12-02 RX ORDER — ONDANSETRON 4 MG/1
4 TABLET, ORALLY DISINTEGRATING ORAL EVERY 30 MIN PRN
Status: DISCONTINUED | OUTPATIENT
Start: 2020-12-02 | End: 2020-12-03 | Stop reason: HOSPADM

## 2020-12-02 RX ORDER — MEPERIDINE HYDROCHLORIDE 25 MG/ML
12.5 INJECTION INTRAMUSCULAR; INTRAVENOUS; SUBCUTANEOUS
Status: DISCONTINUED | OUTPATIENT
Start: 2020-12-02 | End: 2020-12-03 | Stop reason: HOSPADM

## 2020-12-02 RX ORDER — LIDOCAINE 40 MG/G
CREAM TOPICAL
Status: DISCONTINUED | OUTPATIENT
Start: 2020-12-02 | End: 2020-12-03 | Stop reason: HOSPADM

## 2020-12-02 RX ORDER — LIDOCAINE HYDROCHLORIDE 20 MG/ML
INJECTION, SOLUTION INFILTRATION; PERINEURAL PRN
Status: DISCONTINUED | OUTPATIENT
Start: 2020-12-02 | End: 2020-12-02

## 2020-12-02 RX ORDER — CEFAZOLIN SODIUM 1 G/3ML
1 INJECTION, POWDER, FOR SOLUTION INTRAMUSCULAR; INTRAVENOUS SEE ADMIN INSTRUCTIONS
Status: DISCONTINUED | OUTPATIENT
Start: 2020-12-02 | End: 2020-12-03 | Stop reason: HOSPADM

## 2020-12-02 RX ORDER — FENTANYL CITRATE 50 UG/ML
INJECTION, SOLUTION INTRAMUSCULAR; INTRAVENOUS PRN
Status: DISCONTINUED | OUTPATIENT
Start: 2020-12-02 | End: 2020-12-02

## 2020-12-02 RX ORDER — SODIUM CHLORIDE, SODIUM LACTATE, POTASSIUM CHLORIDE, CALCIUM CHLORIDE 600; 310; 30; 20 MG/100ML; MG/100ML; MG/100ML; MG/100ML
INJECTION, SOLUTION INTRAVENOUS CONTINUOUS
Status: DISCONTINUED | OUTPATIENT
Start: 2020-12-02 | End: 2020-12-03 | Stop reason: HOSPADM

## 2020-12-02 RX ADMIN — CEFAZOLIN SODIUM 2 G: 2 INJECTION, SOLUTION INTRAVENOUS at 12:47

## 2020-12-02 RX ADMIN — ACETAMINOPHEN 975 MG: 325 TABLET ORAL at 11:25

## 2020-12-02 RX ADMIN — PROPOFOL 150 MCG/KG/MIN: 10 INJECTION, EMULSION INTRAVENOUS at 12:24

## 2020-12-02 RX ADMIN — SODIUM CHLORIDE, SODIUM LACTATE, POTASSIUM CHLORIDE, CALCIUM CHLORIDE: 600; 310; 30; 20 INJECTION, SOLUTION INTRAVENOUS at 12:18

## 2020-12-02 RX ADMIN — BUPIVACAINE HYDROCHLORIDE AND EPINEPHRINE 30 ML: 5; 5 INJECTION, SOLUTION PERINEURAL at 12:57

## 2020-12-02 RX ADMIN — FENTANYL CITRATE 50 MCG: 50 INJECTION, SOLUTION INTRAMUSCULAR; INTRAVENOUS at 12:24

## 2020-12-02 RX ADMIN — LIDOCAINE HYDROCHLORIDE 60 MG: 20 INJECTION, SOLUTION INFILTRATION; PERINEURAL at 12:24

## 2020-12-02 RX ADMIN — PROPOFOL 100 MG: 10 INJECTION, EMULSION INTRAVENOUS at 12:24

## 2020-12-02 ASSESSMENT — LIFESTYLE VARIABLES: TOBACCO_USE: 1

## 2020-12-02 NOTE — ANESTHESIA PREPROCEDURE EVALUATION
"Anesthesia Pre-Procedure Evaluation    Patient: Carloz Nicolas   MRN:     [id number] Gender:   male   Age:    45 year old :      1975        Preoperative Diagnosis: Sebaceous cyst [L72.3]   Procedure(s):  EXCISION, MASS, TORSO     LABS:  CBC:   Lab Results   Component Value Date    WBC 6.8 2018    HGB 16.6 2020    HGB 18.4 (H) 2018    HCT 52.1 2018     2018     BMP:   Lab Results   Component Value Date     2018    POTASSIUM 4.2 2020    POTASSIUM 4.5 2018    CHLORIDE 104 2018    CO2 27 2018    BUN 15 2018    CR 1.22 2020    CR 0.95 2018     (H) 2018     COAGS: No results found for: PTT, INR, FIBR  POC: No results found for: BGM, HCG, HCGS  OTHER:   Lab Results   Component Value Date    RONI 8.9 2018    ALBUMIN 3.9 2018    PROTTOTAL 7.6 2018    ALT 29 2018    AST 19 2018    ALKPHOS 83 2018    BILITOTAL 0.4 2018    SED 4 2018        Preop Vitals    BP Readings from Last 3 Encounters:   20 122/74   20 126/77   10/27/20 130/78    Pulse Readings from Last 3 Encounters:   20 82   20 105   10/27/20 84      Resp Readings from Last 3 Encounters:   10/26/18 16   18 16   09 14    SpO2 Readings from Last 3 Encounters:   20 100%   10/27/20 99%   10/26/18 96%      Temp Readings from Last 1 Encounters:   20 36.9  C (98.4  F) (Oral)    Ht Readings from Last 1 Encounters:   20 1.778 m (5' 10\")      Wt Readings from Last 1 Encounters:   20 93.4 kg (206 lb)    Estimated body mass index is 29.56 kg/m  as calculated from the following:    Height as of 20: 1.778 m (5' 10\").    Weight as of 20: 93.4 kg (206 lb).     LDA:        History reviewed. No pertinent past medical history.   History reviewed. No pertinent surgical history.   No Known Allergies     Anesthesia Evaluation     . Pt has had prior anesthetic. " Type: MAC    No history of anesthetic complications          ROS/MED HX    ENT/Pulmonary:     (+)other ENT- Eustachian tube dysfunction, tobacco use, Past use , . Other pulmonary disease Pulmonary nodule.    Neurologic: Comment: CRPS 1 right LE  Post herpetic neuralgia  Left arm paresthesia  Implanted spinal cord stimulator      Cardiovascular:     (+) Dyslipidemia, ----. : . . . :. .       METS/Exercise Tolerance:  3 - Able to walk 1-2 blocks without stopping   Hematologic: Comments: Hx/o thrombocytopenia        Musculoskeletal: Comment: Rt CTS, epicondylitis, neck pain, b/l shoulder pain, L4-5 back pain, rt flank pain  (+)  other musculoskeletal- Lumbar facet joint degeneration      GI/Hepatic:  - neg GI/hepatic ROS       Renal/Genitourinary: Comment: Right ureteral stone        Endo:  - neg endo ROS       Psychiatric:         Infectious Disease:  - neg infectious disease ROS       Malignancy:      - no malignancy   Other:    (+) H/O Chronic Pain,H/O chronic opiod use , other significant disability Other (comment)                       PHYSICAL EXAM:   Mental Status/Neuro: A/A/O   Airway: Facies: Feasible  Mallampati: I  Mouth/Opening: Full  TM distance: > 6 cm  Neck ROM: Full   Respiratory: Auscultation: CTAB     Resp. Rate: Normal     Resp. Effort: Normal      CV: Rhythm: Regular  Rate: Age appropriate  Heart: Normal Sounds  Edema: None   Comments:      Dental: Normal Dentition                Assessment:   ASA SCORE: 2    H&P: History and physical reviewed and following examination; no interval change.   Smoking Status:  Non-Smoker/Unknown   NPO Status: NPO Appropriate     Plan:   Anes. Type:  MAC   Pre-Medication: None   Induction:  IV (Standard)   Airway: Native Airway   Access/Monitoring: PIV   Maintenance: N/a     Postop Plan:   Postop Pain: None  Postop Sedation/Airway: Not planned  Disposition: Outpatient     PONV Management:   Adult Risk Factors:, Non-Smoker   Prevention: Ondansetron     CONSENT: Direct  conversation   Plan and risks discussed with: Patient   Blood Products: Consent Deferred (Minimal Blood Loss)       Comments for Plan/Consent:  MAC, sedation, GA as back up, standard ASA monitors  All pertinent results and records reviewed, risks, included but not limited to hypoventilation, hypoxemia, laryngo/bronchospasm, N/V, intraoperative awareness due to sedation only d/w patient, all questions, concerns addressed                 Karlee Newman MD

## 2020-12-02 NOTE — ANESTHESIA POSTPROCEDURE EVALUATION
Anesthesia POST Procedure Evaluation    Patient: Carloz Nicolas   MRN:     [id number] Gender:   male   Age:    45 year old :      1975        Preoperative Diagnosis: Sebaceous cyst [L72.3]   Procedure(s):  EXCISION SEBACEOUS CYST BACK   Postop Comments: No value filed.     Anesthesia Type: MAC       Disposition: Outpatient   Postop Pain Control: Uneventful            Sign Out: Well controlled pain   PONV: No   Neuro/Psych: Uneventful            Sign Out: Acceptable/Baseline neuro status   Airway/Respiratory: Uneventful            Sign Out: Acceptable/Baseline resp. status   CV/Hemodynamics: Uneventful            Sign Out: Acceptable CV status   Other NRE: NONE   DID A NON-ROUTINE EVENT OCCUR? No         Last Anesthesia Record Vitals:  CRNA VITALS  2020 1232 - 2020 1332      2020             Pulse:  95    SpO2:  94 %    Resp Rate (observed):  (!) 1          Last PACU Vitals:  No vitals data found for the desired time range.        Electronically Signed By: Karlee Newman MD, 2020, 2:54 PM

## 2020-12-02 NOTE — OP NOTE
Date of Service: 12/2/2020     STAFF SURGEON:  Alberto Lopez MD     ASSISTANT:  None.     PREOPERATIVE DIAGNOSIS:   Sebaceous cyst of back     POSTOPERATIVE DIAGNOSIS:  Same     NAME OF PROCEDURE(S):   Excision of sebaceous cyst on back     INDICATIONS FOR PROCEDURE:  The patient is a 45-year-old male history of a spinal stimulator and near the scar in his mid back and developed a lump or mass that would cause some discomfort.  On clinical exam this appears likely to be a large sebaceous cyst.  I offered excision.  Risks, benefits, alternatives discussed and consent obtained.     EBL: 5 cc    ANESTHESIA: MAC plus local    COMPLICATIONS: None     DRAINS:  None.     SPECIMENS:   Sebaceous cyst back     IMPLANTS: None     OPERATIVE FINDINGS:   Intact sebaceous cyst just to the patient's left of his mid back vertical scar.  This did abut the scar capsule around some of the leads of the stimulator.  No signs of rupture or active infection.  The cyst measured about 4 cm in greatest dimension on removal.     PROCEDURE DETAIL:  Following consent, the patient was brought from the preoperative holding area to the operating suite and laid right lateral decubitus position.  The back was prepped and draped in usual fashion.  Timeout procedure performed.  I began with injection of half percent Marcaine with epinephrine 1-200,000 around the area.  I then made an elliptical skin incision about 4 cm long transversely over the palpable lesion.  I then sharply dissected through the skin of the back angling out to get around the cyst wall and keep intact.  This appeared to get close to some leaves at the inferior part of the wound which should be is midline scar.  Once removed the lesion appeared as typical sebaceous cyst was passed off for pathology.  Cautery used for hemostasis.  Wound then closed with interrupted 3-0 Vicryl sutures for the deeper tissues and running subcuticular 4-0 Monocryl for skin.  Incision was covered with  skin glue.  Final counts complete.  Patient tolerated seizure well and was discharged from the operating room to recovery in stable condition with plans to discharge home.           Alberto Lopez MD

## 2020-12-02 NOTE — DISCHARGE INSTRUCTIONS
Tylenol given at 1130am    [facility]   Adult Discharge Orders & Instructions     For 24 hours after surgery    1. Get plenty of rest.  A responsible adult must stay with you for at least 24 hours after you leave the hospital.   2. Do not drive or use heavy equipment.  If you have weakness or tingling, don't drive or use heavy equipment until this feeling goes away.  3. Do not drink alcohol.  4. Avoid strenuous or risky activities.  Ask for help when climbing stairs.   5. You may feel lightheaded.  IF so, sit for a few minutes before standing.  Have someone help you get up.   6. If you have nausea (feel sick to your stomach): Drink only clear liquids such as apple juice, ginger ale, broth or 7-Up.  Rest may also help.  Be sure to drink enough fluids.  Move to a regular diet as you feel able.  7. You may have a slight fever. Call the doctor if your fever is over 100 F (37.7 C) (taken under the tongue) or lasts longer than 24 hours.  8. You may have a dry mouth, a sore throat, muscle aches or trouble sleeping.  These should go away after 24 hours.  9. Do not make important or legal decisions.     Call your doctor for any of the followin.  Signs of infection (fever, growing tenderness at the surgery site, a large amount of drainage or bleeding, severe pain, foul-smelling drainage, redness, swelling).    2. It has been over 8 to 10 hours since surgery and you are still not able to urinate (pass water).    3.  Headache for over 24 hours.                  4. Signs of Covid-19 infection (temperature over 100 degrees, shortness of breath, cough, loss of taste/smell, generalized body aches, persistent headache,                  chills, sore throat, nausea/vomiting/diarrhea).    To contact Dr Lopez call [phone].____________________________________

## 2020-12-02 NOTE — ANESTHESIA CARE TRANSFER NOTE
Patient: Carloz Nicolas    Procedure(s):  EXCISION SEBACEOUS CYST BACK    Diagnosis: Sebaceous cyst [L72.3]  Diagnosis Additional Information: No value filed.    Anesthesia Type:   MAC     Note:  Airway :Room Air  Patient transferred to:Phase II  Comments: To Phase II. Report to RN.  VSS Resp status stable.Handoff Report: Identifed the Patient, Identified the Reponsible Provider, Reviewed the pertinent medical history, Discussed the surgical course, Reviewed Intra-OP anesthesia mangement and issues during anesthesia, Set expectations for post-procedure period and Allowed opportunity for questions and acknowledgement of understanding      Vitals: (Last set prior to Anesthesia Care Transfer)    CRNA VITALS  12/2/2020 1232 - 12/2/2020 1307      12/2/2020             Pulse:  95    SpO2:  94 %    Resp Rate (observed):  (!) 1                Electronically Signed By: SCOTT Villalobos CRNA  December 2, 2020  1:07 PM

## 2020-12-07 LAB — COPATH REPORT: NORMAL

## 2021-09-25 ENCOUNTER — HEALTH MAINTENANCE LETTER (OUTPATIENT)
Age: 46
End: 2021-09-25

## 2022-01-15 ENCOUNTER — HEALTH MAINTENANCE LETTER (OUTPATIENT)
Age: 47
End: 2022-01-15

## 2022-04-20 ENCOUNTER — TRANSFERRED RECORDS (OUTPATIENT)
Dept: HEALTH INFORMATION MANAGEMENT | Facility: CLINIC | Age: 47
End: 2022-04-20

## 2022-04-20 LAB
CHOLESTEROL (EXTERNAL): 284 MG/DL
HDLC SERPL-MCNC: 35 MG/DL
LDL CHOLESTEROL CALCULATED (EXTERNAL): 201 MG/DL
NON HDL CHOLESTEROL (EXTERNAL): 249 MG/DL
TRIGLYCERIDES (EXTERNAL): 269 MG/DL

## 2022-05-11 ENCOUNTER — TRANSFERRED RECORDS (OUTPATIENT)
Dept: HEALTH INFORMATION MANAGEMENT | Facility: CLINIC | Age: 47
End: 2022-05-11

## 2022-10-04 ENCOUNTER — TRANSFERRED RECORDS (OUTPATIENT)
Dept: HEALTH INFORMATION MANAGEMENT | Facility: CLINIC | Age: 47
End: 2022-10-04

## 2022-12-02 ENCOUNTER — VIRTUAL VISIT (OUTPATIENT)
Dept: FAMILY MEDICINE | Facility: CLINIC | Age: 47
End: 2022-12-02
Payer: COMMERCIAL

## 2022-12-02 DIAGNOSIS — J02.0 STREPTOCOCCAL PHARYNGITIS: Primary | ICD-10-CM

## 2022-12-02 PROCEDURE — 99213 OFFICE O/P EST LOW 20 MIN: CPT | Mod: 95 | Performed by: FAMILY MEDICINE

## 2022-12-02 ASSESSMENT — ENCOUNTER SYMPTOMS: SORE THROAT: 1

## 2022-12-02 NOTE — PROGRESS NOTES
Carloz is a 47 year old who is being evaluated via a billable video visit.      How would you like to obtain your AVS? MyChart  If the video visit is dropped, the invitation should be resent by: Text to cell phone: [phone]  Will anyone else be joining your video visit? No      Assessment & Plan     Streptococcal pharyngitis  - amoxicillin-clavulanate (AUGMENTIN) 875-125 MG tablet  Dispense: 20 tablet; Refill: 0    Return if symptoms worsen or fail to improve.    Cate العلي MD  [facility]   Carloz is a 47 year old accompanied by his self, presenting for the following health issues:  Pharyngitis and Health Maintenance (Advised patient of .)    Patient states that he had strep 2 weeks ago and didn't finish taking the medication he was prescribed. Thinks the strep came back. No other symptoms besides the sore throat.    Pharyngitis     History of Present Illness       Reason for visit:  Possible strep- didn't finish the meds from 2 weeks ago  Symptom onset:  1-2 weeks ago  Symptoms include:  Sore thoat  Symptom intensity:  Severe  Symptom progression:  Worsening  Had these symptoms before:  Yes  Has tried/received treatment for these symptoms:  Yes  Previous treatment was successful:  No  What makes it worse:  Laying down  What makes it better:  Throat spray, ibuprofen    He eats 0-1 servings of fruits and vegetables daily.He consumes 1 sweetened beverage(s) daily.He exercises with enough effort to increase his heart rate 20 to 29 minutes per day.  He exercises with enough effort to increase his heart rate 7 days per week.   He is taking medications regularly.     A couple of weeks ago, URI sx sore throat ear infection etc  [facility] wife had [facility]  Daughter tested today had strep as well    He didn't get tested at that time bc the dr said based on their exam, they would treat with abx anyway. He took the meds, not sure which kind. Stopped early  though, didn't realize you had to complete the course.     Now has severe throat pain and ear pain again   No fever, no nausea. Sometimes HA.  Ibuprofen helpful and using a lot of chloraseptic  Food and water are going down ok - pain but no sensation of blockage.      Review of Systems   HENT: Positive for sore throat.           Objective       Vitals:  No vitals were obtained today due to virtual visit.    Physical Exam   GENERAL: Healthy, alert and no distress  EYES: Eyes grossly normal to inspection.  No discharge or erythema, or obvious scleral/conjunctival abnormalities.  RESP: Sounds hoarse and congested but no audible wheeze, cough, or visible cyanosis.  No visible retractions or increased work of breathing.    SKIN: Visible skin clear. No significant rash, abnormal pigmentation or lesions.  PSYCH: Mentation appears normal, affect normal/bright, judgment and insight intact, normal speech and appearance well-groomed.            Video-Visit Details    Video Start Time: 1:59 PM    Type of service:  Video Visit    Video End Time: 2:06 PM    Originating Location (pt. Location): Home    Distant Location (provider location):  On-site    Platform used for Video Visit: Debi

## 2022-12-26 ENCOUNTER — HEALTH MAINTENANCE LETTER (OUTPATIENT)
Age: 47
End: 2022-12-26

## 2023-01-03 ENCOUNTER — TRANSFERRED RECORDS (OUTPATIENT)
Dept: HEALTH INFORMATION MANAGEMENT | Facility: CLINIC | Age: 48
End: 2023-01-03

## 2023-02-02 ENCOUNTER — TRANSFERRED RECORDS (OUTPATIENT)
Dept: HEALTH INFORMATION MANAGEMENT | Facility: CLINIC | Age: 48
End: 2023-02-02

## 2023-02-16 ENCOUNTER — MEDICAL CORRESPONDENCE (OUTPATIENT)
Dept: HEALTH INFORMATION MANAGEMENT | Facility: CLINIC | Age: 48
End: 2023-02-16

## 2023-02-16 ENCOUNTER — TRANSFERRED RECORDS (OUTPATIENT)
Dept: HEALTH INFORMATION MANAGEMENT | Facility: CLINIC | Age: 48
End: 2023-02-16

## 2023-02-16 LAB
ALT SERPL-CCNC: 24 U/L (ref 9–46)
AST SERPL-CCNC: 17 U/L (ref 10–40)
CHOLESTEROL (EXTERNAL): 298 MG/DL
CREATININE (EXTERNAL): 0.99 MG/DL (ref 0.6–1.29)
GFR ESTIMATED (EXTERNAL): 95 ML/MIN/1.73M2
GLUCOSE (EXTERNAL): 106 MG/DL (ref 65–99)
HBA1C MFR BLD: 5 %
HDLC SERPL-MCNC: 30 MG/DL
LDL CHOLESTEROL CALCULATED (EXTERNAL): 223 MG/DL
NON HDL CHOLESTEROL (EXTERNAL): 268 MG/DL
POTASSIUM (EXTERNAL): 4.2 MMOL/L (ref 3.5–5.3)
TRIGLYCERIDES (EXTERNAL): 250 MG/DL

## 2023-03-07 ENCOUNTER — TRANSFERRED RECORDS (OUTPATIENT)
Dept: HEALTH INFORMATION MANAGEMENT | Facility: CLINIC | Age: 48
End: 2023-03-07

## 2023-03-26 ENCOUNTER — TRANSFERRED RECORDS (OUTPATIENT)
Dept: HEALTH INFORMATION MANAGEMENT | Facility: CLINIC | Age: 48
End: 2023-03-26
Payer: COMMERCIAL

## 2023-04-10 ENCOUNTER — TRANSCRIBE ORDERS (OUTPATIENT)
Dept: OTHER | Age: 48
End: 2023-04-10

## 2023-04-10 DIAGNOSIS — E83.110 HEREDITARY HEMOCHROMATOSIS (H): ICD-10-CM

## 2023-04-10 DIAGNOSIS — E83.119 HEMOCHROMATOSIS, UNSPECIFIED HEMOCHROMATOSIS TYPE: Primary | ICD-10-CM

## 2023-04-12 ENCOUNTER — PRE VISIT (OUTPATIENT)
Dept: ONCOLOGY | Facility: CLINIC | Age: 48
End: 2023-04-12
Payer: COMMERCIAL

## 2023-04-12 NOTE — TELEPHONE ENCOUNTER
RECORDS STATUS - ALL OTHER DIAGNOSIS      Records received    April 18, 2023 9:30 AM  [address]   Outcome Over 100 pages received and sent to lucy novak to [facility] email - Amay   LABS: 4/22/22, 3/23/23  Office notes: 4/20/22 - 3/26/23      Action    Action Taken 4/12/23  Spoke edna/ Gio @ [facility] [phone], she will fax records/labs over shortly.  8:47 AM

## 2023-04-17 ENCOUNTER — PATIENT OUTREACH (OUTPATIENT)
Dept: ONCOLOGY | Facility: CLINIC | Age: 48
End: 2023-04-17
Payer: COMMERCIAL

## 2023-04-17 NOTE — PROGRESS NOTES
Referral received for benign heme services, see below.    Referral reason: HFE carrier of hemochromatosis    Current abnormal labs: Available in Chart Review    Outreach: Call not placed to patient regarding referral.    Plan: Triage instructions updated and sent to [facility] for completion.

## 2023-04-22 ENCOUNTER — HEALTH MAINTENANCE LETTER (OUTPATIENT)
Age: 48
End: 2023-04-22

## 2023-04-25 DIAGNOSIS — E83.110 HEREDITARY HEMOCHROMATOSIS (H): Primary | ICD-10-CM

## 2023-05-02 ENCOUNTER — VIRTUAL VISIT (OUTPATIENT)
Dept: ONCOLOGY | Facility: CLINIC | Age: 48
End: 2023-05-02
Payer: COMMERCIAL

## 2023-05-02 DIAGNOSIS — D75.1 POLYCYTHEMIA: ICD-10-CM

## 2023-05-02 DIAGNOSIS — E83.110 HEREDITARY HEMOCHROMATOSIS (H): Primary | ICD-10-CM

## 2023-05-02 PROCEDURE — 99204 OFFICE O/P NEW MOD 45 MIN: CPT | Mod: VID | Performed by: INTERNAL MEDICINE

## 2023-05-02 NOTE — LETTER
5/2/2023         RE: Carloz Nicolas  [address] [facility] [address]        Dear Colleague,    Thank you for referring your patient, Carloz Nicolas, to the [facility]. Please see a copy of my visit note below.    Virtual Visit Details    Type of service:  Video Visit     Originating Location (pt. Location): Home  Distant Location (provider location):  Off-site  Platform used for Video Visit: [employer]   Video start time. 11:45 AM   Video stop time. 12:16 PM       Hematology initial visit:  Date on this visit: 5/2/2023    Calroz Nicolas  is referred by Dr.No bonilla. provider found for a hematology consultation. He requires evaluation for elevated hemoglobin.    Primary Physician: Hallie Hi     History Of Present Illness:  Mr. Nicolas is a 47 year old male who presents with diagnosis of elevated hemoglobin.    This is a video visit.    He has history of Complex Regional Pain syndrome.    He mentions that about 3 years ago, he was feeling very weak tired and fatigued and exhausted and has joint aches and pains and decreased libido so he thought that he would be deficient in testosterone.  He started to use testosterone out of black market but about 3 months later he went to see his doctor and was found to have low testosterone and then was started on testosterone replacement.  3 months after that he was noted to have high hemoglobin and since then he has been getting phlebotomy almost every 3 months because every time he goes to get his bloods checked, his hemoglobin is high.  During this time the testosterone dose has also been decreased.    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  At that time he got phlebotomy again and testosterone was stopped.       Looking back he has had elevated hemoglobin at least  since 2015.    He mentions that his fatigue improved a little bit after starting testosterone.  He continues to have joint aches and pains and he has a spinal cord stimulator as well.  He was diagnosed with complex regional pain syndrome.  Over the last several years he has had off-and-on night sweats but he has not noticed any significant change in that pattern recently.  He denies any recent weight loss.  No erythromelalgia.  Over the last couple of years he has noticed a rubbery movable grape sized subcutaneous lump above the left hand which has slowly increased in size.  It does not bother him.  No other new swellings.  Over the last few years he has noticed that he gets near syncope with excessive laughing.  He does have dyspnea on exertion/climbing his stairs.  He was a chronic smoker but quit 13 years ago but he continues to chew tobacco.  No GI problems.  No history of blood clots.  No abnormal bleeding.     ECOG 1    ROS:  A comprehensive ROS was otherwise neg        Past Medical/Surgical History:  No past medical history on file.  Past Surgical History:   Procedure Laterality Date     BACK SURGERY      spine stimulator     EXCISE MASS TRUNK N/A 12/2/2020    Procedure: EXCISION SEBACEOUS CYST BACK;  Surgeon: Alberto Lopez MD;  Location: [facility] OR   Chicken pox 11 years ago  Pericarditis  Pleurisy  Epididymitis  Kidney stones  Spinal cord stimulator to control the pain      Allergies:  Allergies as of 05/02/2023     (No Known Allergies)     Current Medications:  Current Outpatient Medications   Medication Sig Dispense Refill     ascorbic acid (VITAMIN C) 1000 MG TABS Take 500 mg by mouth       Cholecalciferol 10 MCG (400 UNIT) CAPS Take 400 Units by mouth daily       DULoxetine (CYMBALTA) 60 MG capsule Take 60 mg by mouth daily       Omega-3 Fatty Acids (FISH OIL OMEGA-3 PO)        oxyCODONE (ROXICODONE) 5 MG immediate release tablet Take 1-2 tablets by mouth every 4 hours if needed for Pain (max 8 per  day)   USE DATES:06/0116-6/15/2016       zolpidem (AMBIEN) 5 MG tablet Take 5 mg by mouth nightly as needed        Family History:  No family history on file.  No family history of bleeding or clotting disorder.  5 kids- all healthy    Social History:  Social History     Socioeconomic History     Marital status:      Spouse name: Not on file     Number of children: Not on file     Years of education: Not on file     Highest education level: Not on file   Occupational History     Not on file   Tobacco Use     Smoking status: Former     Packs/day: 1.00     Types: Cigarettes     Smokeless tobacco: Current     Types: Chew   Vaping Use     Vaping status: Never Used   Substance and Sexual Activity     Alcohol use: No     Drug use: No     Sexual activity: Not on file   Other Topics Concern     Parent/sibling w/ CABG, MI or angioplasty before 65F 55M? Not Asked   Social History Narrative     Not on file     Social Determinants of Health     Financial Resource Strain: Not on file   Food Insecurity: Not on file   Transportation Needs: Not on file   Physical Activity: Not on file   Stress: Not on file   Social Connections: Not on file   Intimate Partner Violence: Not on file   Housing Stability: Not on file     Quit smoking 13 years ago. No etoh for 8 years. Lives with wife.       Physical Exam:  There were no vitals taken for this visit.   Wt Readings from Last 4 Encounters:   11/18/20 93.4 kg (206 lb)   11/02/20 92.1 kg (203 lb)   10/27/20 91.6 kg (202 lb)   10/26/18 93.4 kg (206 lb)         Constitutional.  Does not seem to be in any acute distress.  Eyes.  No redness or discharge noted.  Respiratory.  Speaking in full sentences.  Breathing seems comfortable without any accessory use of muscles.    Skin.  Visualized his skin does not show any obvious rashes.  Musculoskeletal.  Range of motion for visualized areas is intact.  Neurological.  Alert and oriented x3.  Psychiatric.  Mood, mentation and affect are normal.   Decision making capacity is intact.      The rest of a comprehensive physical examination is deferred due to Public [facility] video visit restrictions.    Laboratory/Imaging Studies  Reviewed     4/30/2023  CBC shows Hg 17.9    BMP- unremarkable    2/17/2023     CBC shows hemoglobin 21.2.  Hematocrit 60.3 RBC count 6.32.  MCV 95  WBC 7.  Platelets 226  Chemistry is unremarkable.  Testosterone.  582 ng/dL    Looking back, he has had high hemoglobin in 2015 when it was 17.8.  Since then he has had high hemoglobin levels.    9/15/2022.  Hemoglobin was 18.4.  Hematocrit 55.1.  RBC count 6.67   ferritin 60  Testosterone 397    3/7/2023.  Hemochromatosis mutation testing shows that he is heterozygous for H63D mutation in HFE gene.    4/30/2023 CT CHEST PE STUDY    1.  No CT evidence for pulmonary embolism.   2.  Low lung volumes without infiltrates or pleural effusion.   3.  No mediastinal abnormalities.        EXAM: CT ABDOMEN AND PELVIS STONE PROTOCOL WITHOUT CONTRAST   LOCATION: [facility]   DATE/TIME: 01/29/2022, 3:27 PM     INDICATION: Flank pain, kidney stone suspected, left.   COMPARISON: 05/24/2020 CT.   TECHNIQUE: CT scan of the abdomen and pelvis was performed without oral or IV contrast. Multiplanar reformats were obtained. Dose reduction techniques were used.   CONTRAST: None.     FINDINGS:   LOWER CHEST: Lung bases are clear.     HEPATOBILIARY: Normal noncontrast appearance. No calcified gallstones.     PANCREAS: Normal.     SPLEEN: Normal.     ADRENAL GLANDS: Normal.     KIDNEY/BLADDER: Obstructing 4 mm distal left ureteral calculus pelvic portion left ureter with mild hydronephrosis. Three tiny 1-2 mm left intrarenal calculi and single 2 mm right intrarenal calculus. No right-sided hydronephrosis or ureteral calculus.     BOWEL: No obstruction or inflammatory change. Normal appendix. No ascites or free air.     LYMPH NODES: No lymphadenopathy.     VASCULATURE: No abdominal aortic aneurysm.      PELVIC ORGANS: No pelvic masses.     MUSCULOSKELETAL: No acute findings. Spinal neurostimulator.     IMPRESSION:   1.  Small 4 mm distal left ureteral calculus with mild hydronephrosis.   2.  A few tiny nonobstructing bilateral intrarenal calculi.   3.  No other inflammatory changes or acute findings within limitations of noncontrast exam.      ASSESSMENT/PLAN:    Polycythemia.  He has elevated hemoglobin, hematocrit and RBC count.  This has been going on since 2015.  He was started on testosterone replacement about 3 years ago and since then he has been getting off-and-on phlebotomy but hemoglobin continues to increase.  Last phlebotomy was in early March 2023.  He has a history of chronic smoking but quit 30 years ago but continues to chew tobacco.  Testosterone has been stopped about 1-1/2 months ago.    I am concerned that he could have an underlying myeloproliferative neoplasm like polycythemia vera so I would like to do testing to rule underlying myeloproliferative neoplasm.  We will check peripheral blood smear, erythropoietin and myeloproliferative neoplasm NGS panel.  I will also check LDH and uric acid.  He had a CT scan in January 2022 and spleen was normal in size.  Continue to hold testosterone.  I have advised him to start taking baby aspirin once daily.    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  Ferritin level in September 2022 was not elevated but he has done phlebotomy almost every 3 months over the last 3 years also.  I would recommend complete iron studies.  Usually people with heterozygosity for H63D mutation will not have significant iron overload    Discussion regarding tobacco.  Advised him to stop chewing tobacco.    I will see him back in about 4 weeks in follow-up.    All of his questions were answered to his satisfaction.  He is agreeable and comfortable with the plan.    Pau King MD                 Again, thank you for allowing me to participate in the care of your  patient.        Sincerely,        Pau King MD

## 2023-05-02 NOTE — PROGRESS NOTES
Virtual Visit Details    Type of service:  Video Visit     Originating Location (pt. Location): Home  Distant Location (provider location):  Off-site  Platform used for Video Visit: [employer]   Video start time. 11:45 AM   Video stop time. 12:16 PM       Hematology initial visit:  Date on this visit: 5/2/2023    Carloz Nicolas  is referred by Dr.No bonilla. provider found for a hematology consultation. He requires evaluation for elevated hemoglobin.    Primary Physician: Hallie Hi     History Of Present Illness:  Mr. Nicolas is a 47 year old male who presents with diagnosis of elevated hemoglobin.    This is a video visit.    He has history of Complex Regional Pain syndrome.    He mentions that about 3 years ago, he was feeling very weak tired and fatigued and exhausted and has joint aches and pains and decreased libido so he thought that he would be deficient in testosterone.  He started to use testosterone out of black market but about 3 months later he went to see his doctor and was found to have low testosterone and then was started on testosterone replacement.  3 months after that he was noted to have high hemoglobin and since then he has been getting phlebotomy almost every 3 months because every time he goes to get his bloods checked, his hemoglobin is high.  During this time the testosterone dose has also been decreased.    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  At that time he got phlebotomy again and testosterone was stopped.       Looking back he has had elevated hemoglobin at least since 2015.    He mentions that his fatigue improved a little bit after starting testosterone.  He continues to have joint aches and pains and he has a spinal cord stimulator as well.  He was diagnosed with complex regional pain syndrome.  Over the  last several years he has had off-and-on night sweats but he has not noticed any significant change in that pattern recently.  He denies any recent weight loss.  No erythromelalgia.  Over the last couple of years he has noticed a rubbery movable grape sized subcutaneous lump above the left hand which has slowly increased in size.  It does not bother him.  No other new swellings.  Over the last few years he has noticed that he gets near syncope with excessive laughing.  He does have dyspnea on exertion/climbing his stairs.  He was a chronic smoker but quit 13 years ago but he continues to chew tobacco.  No GI problems.  No history of blood clots.  No abnormal bleeding.     ECOG 1    ROS:  A comprehensive ROS was otherwise neg        Past Medical/Surgical History:  No past medical history on file.  Past Surgical History:   Procedure Laterality Date     BACK SURGERY      spine stimulator     EXCISE MASS TRUNK N/A 12/2/2020    Procedure: EXCISION SEBACEOUS CYST BACK;  Surgeon: Alberto Lopez MD;  Location: [facility] OR   Chicken pox 11 years ago  Pericarditis  Pleurisy  Epididymitis  Kidney stones  Spinal cord stimulator to control the pain      Allergies:  Allergies as of 05/02/2023     (No Known Allergies)     Current Medications:  Current Outpatient Medications   Medication Sig Dispense Refill     ascorbic acid (VITAMIN C) 1000 MG TABS Take 500 mg by mouth       Cholecalciferol 10 MCG (400 UNIT) CAPS Take 400 Units by mouth daily       DULoxetine (CYMBALTA) 60 MG capsule Take 60 mg by mouth daily       Omega-3 Fatty Acids (FISH OIL OMEGA-3 PO)        oxyCODONE (ROXICODONE) 5 MG immediate release tablet Take 1-2 tablets by mouth every 4 hours if needed for Pain (max 8 per day)   USE DATES:06/0116-6/15/2016       zolpidem (AMBIEN) 5 MG tablet Take 5 mg by mouth nightly as needed        Family History:  No family history on file.  No family history of bleeding or clotting disorder.  5 kids- all healthy    Social  History:  Social History     Socioeconomic History     Marital status:      Spouse name: Not on file     Number of children: Not on file     Years of education: Not on file     Highest education level: Not on file   Occupational History     Not on file   Tobacco Use     Smoking status: Former     Packs/day: 1.00     Types: Cigarettes     Smokeless tobacco: Current     Types: Chew   Vaping Use     Vaping status: Never Used   Substance and Sexual Activity     Alcohol use: No     Drug use: No     Sexual activity: Not on file   Other Topics Concern     Parent/sibling w/ CABG, MI or angioplasty before 65F 55M? Not Asked   Social History Narrative     Not on file     Social Determinants of Health     Financial Resource Strain: Not on file   Food Insecurity: Not on file   Transportation Needs: Not on file   Physical Activity: Not on file   Stress: Not on file   Social Connections: Not on file   Intimate Partner Violence: Not on file   Housing Stability: Not on file     Quit smoking 13 years ago. No etoh for 8 years. Lives with wife.       Physical Exam:  There were no vitals taken for this visit.   Wt Readings from Last 4 Encounters:   11/18/20 93.4 kg (206 lb)   11/02/20 92.1 kg (203 lb)   10/27/20 91.6 kg (202 lb)   10/26/18 93.4 kg (206 lb)         Constitutional.  Does not seem to be in any acute distress.  Eyes.  No redness or discharge noted.  Respiratory.  Speaking in full sentences.  Breathing seems comfortable without any accessory use of muscles.    Skin.  Visualized his skin does not show any obvious rashes.  Musculoskeletal.  Range of motion for visualized areas is intact.  Neurological.  Alert and oriented x3.  Psychiatric.  Mood, mentation and affect are normal.  Decision making capacity is intact.      The rest of a comprehensive physical examination is deferred due to [facility] video visit restrictions.    Laboratory/Imaging Studies  Reviewed     4/30/2023  CBC shows Hg 17.9    BMP-  unremarkable    2/17/2023     CBC shows hemoglobin 21.2.  Hematocrit 60.3 RBC count 6.32.  MCV 95  WBC 7.  Platelets 226  Chemistry is unremarkable.  Testosterone.  582 ng/dL    Looking back, he has had high hemoglobin in 2015 when it was 17.8.  Since then he has had high hemoglobin levels.    9/15/2022.  Hemoglobin was 18.4.  Hematocrit 55.1.  RBC count 6.67   ferritin 60  Testosterone 397    3/7/2023.  Hemochromatosis mutation testing shows that he is heterozygous for H63D mutation in HFE gene.    4/30/2023 CT CHEST PE STUDY    1.  No CT evidence for pulmonary embolism.   2.  Low lung volumes without infiltrates or pleural effusion.   3.  No mediastinal abnormalities.        EXAM: CT ABDOMEN AND PELVIS STONE PROTOCOL WITHOUT CONTRAST   LOCATION: [facility]   DATE/TIME: 01/29/2022, 3:27 PM     INDICATION: Flank pain, kidney stone suspected, left.   COMPARISON: 05/24/2020 CT.   TECHNIQUE: CT scan of the abdomen and pelvis was performed without oral or IV contrast. Multiplanar reformats were obtained. Dose reduction techniques were used.   CONTRAST: None.     FINDINGS:   LOWER CHEST: Lung bases are clear.     HEPATOBILIARY: Normal noncontrast appearance. No calcified gallstones.     PANCREAS: Normal.     SPLEEN: Normal.     ADRENAL GLANDS: Normal.     KIDNEY/BLADDER: Obstructing 4 mm distal left ureteral calculus pelvic portion left ureter with mild hydronephrosis. Three tiny 1-2 mm left intrarenal calculi and single 2 mm right intrarenal calculus. No right-sided hydronephrosis or ureteral calculus.     BOWEL: No obstruction or inflammatory change. Normal appendix. No ascites or free air.     LYMPH NODES: No lymphadenopathy.     VASCULATURE: No abdominal aortic aneurysm.     PELVIC ORGANS: No pelvic masses.     MUSCULOSKELETAL: No acute findings. Spinal neurostimulator.     IMPRESSION:   1.  Small 4 mm distal left ureteral calculus with mild hydronephrosis.   2.  A few tiny nonobstructing bilateral intrarenal  calculi.   3.  No other inflammatory changes or acute findings within limitations of noncontrast exam.      ASSESSMENT/PLAN:    Polycythemia.  He has elevated hemoglobin, hematocrit and RBC count.  This has been going on since 2015.  He was started on testosterone replacement about 3 years ago and since then he has been getting off-and-on phlebotomy but hemoglobin continues to increase.  Last phlebotomy was in early March 2023.  He has a history of chronic smoking but quit 30 years ago but continues to chew tobacco.  Testosterone has been stopped about 1-1/2 months ago.    I am concerned that he could have an underlying myeloproliferative neoplasm like polycythemia vera so I would like to do testing to rule underlying myeloproliferative neoplasm.  We will check peripheral blood smear, erythropoietin and myeloproliferative neoplasm NGS panel.  I will also check LDH and uric acid.  He had a CT scan in January 2022 and spleen was normal in size.  Continue to hold testosterone.  I have advised him to start taking baby aspirin once daily.    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  Ferritin level in September 2022 was not elevated but he has done phlebotomy almost every 3 months over the last 3 years also.  I would recommend complete iron studies.  Usually people with heterozygosity for H63D mutation will not have significant iron overload    Discussion regarding tobacco.  Advised him to stop chewing tobacco.    I will see him back in about 4 weeks in follow-up.    All of his questions were answered to his satisfaction.  He is agreeable and comfortable with the plan.    Pau King MD

## 2023-05-02 NOTE — NURSING NOTE
Is the patient currently in the state of [place]? YES    Visit mode:VIDEO    If the visit is dropped, the patient can be reconnected by: VIDEO VISIT: Text to cell phone: [phone]    Will anyone else be joining the visit? NO      How would you like to obtain your AVS? MyChart    Are changes needed to the allergy or medication list? NO    Reason for visit: Video Visit (Consult)

## 2023-05-07 ENCOUNTER — LAB (OUTPATIENT)
Dept: LAB | Facility: CLINIC | Age: 48
End: 2023-05-07
Payer: COMMERCIAL

## 2023-05-07 DIAGNOSIS — D75.1 POLYCYTHEMIA: ICD-10-CM

## 2023-05-07 DIAGNOSIS — E83.110 HEREDITARY HEMOCHROMATOSIS (H): ICD-10-CM

## 2023-05-07 LAB
INTERPRETATION: NORMAL
SIGNIFICANT RESULTS: NORMAL
SPECIMEN DESCRIPTION: NORMAL
TEST DETAILS, MDL: NORMAL

## 2023-05-07 PROCEDURE — 36415 COLL VENOUS BLD VENIPUNCTURE: CPT

## 2023-05-07 PROCEDURE — 81338 MPL GENE COMMON VARIANTS: CPT

## 2023-05-07 PROCEDURE — 82668 ASSAY OF ERYTHROPOIETIN: CPT | Mod: 90

## 2023-05-07 PROCEDURE — 81339 MPL GENE SEQ ALYS EXON 10: CPT

## 2023-05-07 PROCEDURE — 85045 AUTOMATED RETICULOCYTE COUNT: CPT

## 2023-05-07 PROCEDURE — 84238 ASSAY NONENDOCRINE RECEPTOR: CPT | Mod: 90

## 2023-05-07 PROCEDURE — 84550 ASSAY OF BLOOD/URIC ACID: CPT

## 2023-05-07 PROCEDURE — 83540 ASSAY OF IRON: CPT

## 2023-05-07 PROCEDURE — 81219 CALR GENE COM VARIANTS: CPT

## 2023-05-07 PROCEDURE — 81270 JAK2 GENE: CPT

## 2023-05-07 PROCEDURE — 82728 ASSAY OF FERRITIN: CPT

## 2023-05-07 PROCEDURE — 99000 SPECIMEN HANDLING OFFICE-LAB: CPT

## 2023-05-07 PROCEDURE — 85060 BLOOD SMEAR INTERPRETATION: CPT | Performed by: PATHOLOGY

## 2023-05-07 PROCEDURE — 83615 LACTATE (LD) (LDH) ENZYME: CPT

## 2023-05-07 PROCEDURE — 83550 IRON BINDING TEST: CPT

## 2023-05-07 PROCEDURE — 85025 COMPLETE CBC W/AUTO DIFF WBC: CPT

## 2023-05-07 PROCEDURE — G0452 MOLECULAR PATHOLOGY INTERPR: HCPCS | Mod: 59 | Performed by: PATHOLOGY

## 2023-05-08 LAB
BASOPHILS # BLD AUTO: 0 10E3/UL (ref 0–0.2)
BASOPHILS NFR BLD AUTO: 1 %
EOSINOPHIL # BLD AUTO: 0.4 10E3/UL (ref 0–0.7)
EOSINOPHIL NFR BLD AUTO: 5 %
ERYTHROCYTE [DISTWIDTH] IN BLOOD BY AUTOMATED COUNT: 12.4 % (ref 10–15)
FERRITIN SERPL-MCNC: 273 NG/ML (ref 26–388)
HCT VFR BLD AUTO: 51.5 % (ref 40–53)
HGB BLD-MCNC: 18.5 G/DL (ref 13.3–17.7)
IMM GRANULOCYTES # BLD: 0.1 10E3/UL
IMM GRANULOCYTES NFR BLD: 1 %
IRON SATN MFR SERPL: 44 % (ref 15–46)
IRON SERPL-MCNC: 146 UG/DL (ref 35–180)
LDH SERPL L TO P-CCNC: 212 U/L (ref 85–227)
LYMPHOCYTES # BLD AUTO: 2.9 10E3/UL (ref 0.8–5.3)
LYMPHOCYTES NFR BLD AUTO: 42 %
MCH RBC QN AUTO: 32.2 PG (ref 26.5–33)
MCHC RBC AUTO-ENTMCNC: 35.9 G/DL (ref 31.5–36.5)
MCV RBC AUTO: 90 FL (ref 78–100)
MONOCYTES # BLD AUTO: 0.6 10E3/UL (ref 0–1.3)
MONOCYTES NFR BLD AUTO: 8 %
NEUTROPHILS # BLD AUTO: 3.1 10E3/UL (ref 1.6–8.3)
NEUTROPHILS NFR BLD AUTO: 43 %
NRBC # BLD AUTO: 0 10E3/UL
NRBC BLD AUTO-RTO: 0 /100
PLATELET # BLD AUTO: 226 10E3/UL (ref 150–450)
RBC # BLD AUTO: 5.75 10E6/UL (ref 4.4–5.9)
RETICS # AUTO: 0.15 10E6/UL (ref 0.03–0.1)
RETICS/RBC NFR AUTO: 2.7 % (ref 0.5–2)
TIBC SERPL-MCNC: 331 UG/DL (ref 240–430)
URATE SERPL-MCNC: 4.7 MG/DL (ref 3.5–7.2)
WBC # BLD AUTO: 7 10E3/UL (ref 4–11)

## 2023-05-09 LAB — EPO SERPL-ACNC: 8 MU/ML

## 2023-05-10 LAB
PATH REPORT.COMMENTS IMP SPEC: NORMAL
PATH REPORT.COMMENTS IMP SPEC: NORMAL
PATH REPORT.FINAL DX SPEC: NORMAL
PATH REPORT.MICROSCOPIC SPEC OTHER STN: NORMAL
PATH REPORT.MICROSCOPIC SPEC OTHER STN: NORMAL
STFR SERPL-MCNC: 2.5 MG/L

## 2023-05-30 ENCOUNTER — VIRTUAL VISIT (OUTPATIENT)
Dept: ONCOLOGY | Facility: CLINIC | Age: 48
End: 2023-05-30
Payer: COMMERCIAL

## 2023-05-30 DIAGNOSIS — E83.110 HEREDITARY HEMOCHROMATOSIS (H): Primary | ICD-10-CM

## 2023-05-30 DIAGNOSIS — D75.1 POLYCYTHEMIA: ICD-10-CM

## 2023-05-30 PROCEDURE — 99214 OFFICE O/P EST MOD 30 MIN: CPT | Mod: VID | Performed by: INTERNAL MEDICINE

## 2023-05-30 NOTE — NURSING NOTE
Is the patient currently in the state of [place]? YES    Visit mode:VIDEO    If the visit is dropped, the patient can be reconnected by: VIDEO VISIT: Text to cell phone: [phone]    Will anyone else be joining the visit? NO      How would you like to obtain your AVS? MyChart    Are changes needed to the allergy or medication list? NO    Reason for visit: RECHECK

## 2023-05-30 NOTE — LETTER
5/30/2023         RE: Carloz Nicolas  [address]  [place] [address]        Dear Colleague,    Thank you for referring your patient, Carloz Nicolas, to the [facility]. Please see a copy of my visit note below.    Virtual Visit Details    Type of service:  Video Visit   Video start time. 3:24 PM   Video stop time. 3:31 PM   Originating Location (pt. Location): Home  Distant Location (provider location):  Off-site  Platform used for Video Visit: [employer]     Hematology follow-up visit:  Date on this visit: 5/30/23     Carloz Nicolas  is referred by Dr.No bonilla. provider found for a hematology consultation. He requires evaluation for elevated hemoglobin.    Primary Physician: Hallie Hi     History Of Present Illness:  Mr. Nicolas is a 47 year old male who presents with diagnosis of elevated hemoglobin.  I initially saw him 5/2/2023.  Please see my previous note for details.  I have copied and updated from prior notes.      He has history of Complex Regional Pain syndrome.    He mentions that about 3 years ago, he was feeling very weak tired and fatigued and exhausted and has joint aches and pains and decreased libido so he thought that he would be deficient in testosterone.  He started to use testosterone out of black market but about 3 months later he went to see his doctor and was found to have low testosterone and then was started on testosterone replacement.  3 months after that he was noted to have high hemoglobin and since then he has been getting phlebotomy almost every 3 months because every time he goes to get his bloods checked, his hemoglobin is high.  During this time the testosterone dose has also been decreased.    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  At that time he got phlebotomy again  and testosterone was stopped.       Looking back he has had elevated hemoglobin at least since 2015.    He mentions that his fatigue improved a little bit after starting testosterone.  He continues to have joint aches and pains and he has a spinal cord stimulator as well.  He was diagnosed with complex regional pain syndrome.  Over the last several years he has had off-and-on night sweats but he has not noticed any significant change in that pattern recently.  He denies any recent weight loss.  No erythromelalgia.  Over the last couple of years he has noticed a rubbery movable grape sized subcutaneous lump above the left hand which has slowly increased in size.  It does not bother him.  No other new swellings.  Over the last few years he has noticed that he gets near syncope with excessive laughing.  He does have dyspnea on exertion/climbing his stairs.  He was a chronic smoker but quit 13 years ago but he continues to chew tobacco.  No GI problems.  No history of blood clots.  No abnormal bleeding.     Interval history    This is a video visit.  Overall he is doing well.  He has noticed slightly more energy as compared to previously although he has not been on testosterone since mid March.  He continues to chew tobacco.  No B symptoms.  No erythromelalgia.  He continues to take baby aspirin once daily.    ECOG 1    ROS:  A ROS was otherwise neg    I reviewed other history in epic as below.        Past Medical/Surgical History:  No past medical history on file.  Past Surgical History:   Procedure Laterality Date     BACK SURGERY      spine stimulator     EXCISE MASS TRUNK N/A 12/2/2020    Procedure: EXCISION SEBACEOUS CYST BACK;  Surgeon: Alberto Lopez MD;  Location: [facility] OR   Chicken pox 11 years ago  Pericarditis  Pleurisy  Epididymitis  Kidney stones  Spinal cord stimulator to control the pain      Allergies:  Allergies as of 05/30/2023     (No Known Allergies)     Current Medications:  Current Outpatient  Medications   Medication Sig Dispense Refill     ascorbic acid (VITAMIN C) 1000 MG TABS Take 500 mg by mouth (Patient not taking: Reported on 2023)       Cholecalciferol 10 MCG (400 UNIT) CAPS Take 400 Units by mouth daily       DULoxetine (CYMBALTA) 60 MG capsule Take 60 mg by mouth daily       Omega-3 Fatty Acids (FISH OIL OMEGA-3 PO)        oxyCODONE (ROXICODONE) 5 MG immediate release tablet Take 1-2 tablets by mouth every 4 hours if needed for Pain (max 8 per day)   USE DATES:116-6/15/2016       tiZANidine (ZANAFLEX) 4 MG tablet TAKE 1 TABLET BY MOUTH AT BEDTIME IF NEEDED FOR MUSCLE SPASM.       zolpidem (AMBIEN) 5 MG tablet Take 5 mg by mouth nightly as needed        Family History:  No family history on file.  No family history of bleeding or clotting disorder.  5 kids- all healthy    Social History:  Social History     Socioeconomic History     Marital status:      Spouse name: Not on file     Number of children: Not on file     Years of education: Not on file     Highest education level: Not on file   Occupational History     Not on file   Tobacco Use     Smoking status: Former     Packs/day: 1.00     Types: Cigarettes     Quit date:      Years since quittin.4     Smokeless tobacco: Current     Types: Chew   Vaping Use     Vaping status: Never Used   Substance and Sexual Activity     Alcohol use: No     Drug use: No     Sexual activity: Not on file   Other Topics Concern     Parent/sibling w/ CABG, MI or angioplasty before 65F 55M? Not Asked   Social History Narrative     Not on file     Social Determinants of Health     Financial Resource Strain: Not on file   Food Insecurity: Not on file   Transportation Needs: Not on file   Physical Activity: Not on file   Stress: Not on file   Social Connections: Not on file   Intimate Partner Violence: Not on file   Housing Stability: Not on file     Quit smoking 13 years ago. No etoh for 8 years. Lives with wife.       Physical Exam:  There  were no vitals taken for this visit.   Wt Readings from Last 4 Encounters:   11/18/20 93.4 kg (206 lb)   11/02/20 92.1 kg (203 lb)   10/27/20 91.6 kg (202 lb)   10/26/18 93.4 kg (206 lb)       Constitutional.  Looks well and in no apparent distress.   Eyes.  Without eye redness or apparent jaundice.   Respiratory.  Non labored breathing. Speaking in full sentences.    Skin.  No concerning skin rashes on the skin visualized.   Neurological.  Is alert and oriented.  Psychiatric.  Mood and affect seem appropriate.      The rest of a comprehensive physical examination is deferred due to Public Health Emergency video visit restrictions.      Laboratory/Imaging Studies  Reviewed   5/7/2023    CBC shows WBC 7.  Hemoglobin 18.5.  Platelets 226.    Erythropoietin 8.  Ferritin 273.  Iron 146.  TIBC 331.  Iron saturation index 44%.  Soluble transferrin receptor 2.5.  .  Uric acid 4.7.    Myeloproliferative neoplasm NGS: No mutations were identified in the analyzed gene regions of JAK2, CALR, or MPL genes.    4/30/2023  CBC shows Hg 17.9    BMP- unremarkable    2/17/2023     CBC shows hemoglobin 21.2.  Hematocrit 60.3 RBC count 6.32.  MCV 95  WBC 7.  Platelets 226  Chemistry is unremarkable.  Testosterone.  582 ng/dL    Looking back, he has had high hemoglobin in 2015 when it was 17.8.  Since then he has had high hemoglobin levels.    9/15/2022.  Hemoglobin was 18.4.  Hematocrit 55.1.  RBC count 6.67   ferritin 60  Testosterone 397    3/7/2023.  Hemochromatosis mutation testing shows that he is heterozygous for H63D mutation in HFE gene.    4/30/2023 CT CHEST PE STUDY    1.  No CT evidence for pulmonary embolism.   2.  Low lung volumes without infiltrates or pleural effusion.   3.  No mediastinal abnormalities.        EXAM: CT ABDOMEN AND PELVIS STONE PROTOCOL WITHOUT CONTRAST   LOCATION: [facility]   DATE/TIME: 01/29/2022, 3:27 PM     INDICATION: Flank pain, kidney stone suspected, left.   COMPARISON: 05/24/2020 CT.    TECHNIQUE: CT scan of the abdomen and pelvis was performed without oral or IV contrast. Multiplanar reformats were obtained. Dose reduction techniques were used.   CONTRAST: None.     FINDINGS:   LOWER CHEST: Lung bases are clear.     HEPATOBILIARY: Normal noncontrast appearance. No calcified gallstones.     PANCREAS: Normal.     SPLEEN: Normal.     ADRENAL GLANDS: Normal.     KIDNEY/BLADDER: Obstructing 4 mm distal left ureteral calculus pelvic portion left ureter with mild hydronephrosis. Three tiny 1-2 mm left intrarenal calculi and single 2 mm right intrarenal calculus. No right-sided hydronephrosis or ureteral calculus.     BOWEL: No obstruction or inflammatory change. Normal appendix. No ascites or free air.     LYMPH NODES: No lymphadenopathy.     VASCULATURE: No abdominal aortic aneurysm.     PELVIC ORGANS: No pelvic masses.     MUSCULOSKELETAL: No acute findings. Spinal neurostimulator.     IMPRESSION:   1.  Small 4 mm distal left ureteral calculus with mild hydronephrosis.   2.  A few tiny nonobstructing bilateral intrarenal calculi.   3.  No other inflammatory changes or acute findings within limitations of noncontrast exam.      ASSESSMENT/PLAN:    Polycythemia.  He has elevated hemoglobin, hematocrit and RBC count.  This has been going on since 2015.  He was started on testosterone replacement about 3 years ago and since then he has been getting off-and-on phlebotomy but hemoglobin continues to increase.  Last phlebotomy was in early March 2023.  He has a history of chronic smoking but quit 30 years ago but continues to chew tobacco.  Testosterone was stopped around mid March 2023.      Repeat testing showed hemoglobin 18.5.  Erythropoietin is 8 and normal.  Myeloproliferative neoplasm NGS panel is unremarkable.  LDH and uric acid are normal.  Previously he did not have any splenomegaly.    Currently he has no evidence of polycythemia vera.  I would recommend continue to hold testosterone and stop  chewing tobacco.  Previously he was a smoker but quit many years ago.  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  Continue the aspirin once a day.  I would recommend to repeat labs in 4 months.    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  Ferritin level in September 2022 was not elevated but he has done phlebotomy almost every 3 months over the last 3 years also.  Usually people with heterozygosity for H63D mutation will not have significant iron overload.  Most recent iron studies are in the normal range.  Ferritin is 273.  I would recommend checking iron studies every 4 months.  I told him to try not to donate blood for the next 4 months as this will give us a good picture how his body is retaining excess iron and how the hemoglobin/hematocrit is doing without phlebotomies.    Discussion regarding chewing tobacco.  Among other problems it can cause cancers of the oral cavity/larynx as well.  We again discussed that it is very important that he stops chewing tobacco.       Return to clinic in 4 months with labs prior.    All of his questions were answered to his satisfaction.  He is agreeable and comfortable with the plan.    Pau King MD               Again, thank you for allowing me to participate in the care of your patient.        Sincerely,        Pau King MD

## 2023-05-30 NOTE — PROGRESS NOTES
Virtual Visit Details    Type of service:  Video Visit   Video start time. 3:24 PM   Video stop time. 3:31 PM   Originating Location (pt. Location): Home  Distant Location (provider location):  Off-site  Platform used for Video Visit: [employer]     Hematology follow-up visit:  Date on this visit: 5/30/23     Carloz Nicolas  is referred by Dr.No bonilla. provider found for a hematology consultation. He requires evaluation for elevated hemoglobin.    Primary Physician: Hallie Hi     History Of Present Illness:  Mr. Nicolas is a 47 year old male who presents with diagnosis of elevated hemoglobin.  I initially saw him 5/2/2023.  Please see my previous note for details.  I have copied and updated from prior notes.      He has history of Complex Regional Pain syndrome.    He mentions that about 3 years ago, he was feeling very weak tired and fatigued and exhausted and has joint aches and pains and decreased libido so he thought that he would be deficient in testosterone.  He started to use testosterone out of black market but about 3 months later he went to see his doctor and was found to have low testosterone and then was started on testosterone replacement.  3 months after that he was noted to have high hemoglobin and since then he has been getting phlebotomy almost every 3 months because every time he goes to get his bloods checked, his hemoglobin is high.  During this time the testosterone dose has also been decreased.    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  At that time he got phlebotomy again and testosterone was stopped.       Looking back he has had elevated hemoglobin at least since 2015.    He mentions that his fatigue improved a little bit after starting testosterone.  He continues to have joint aches and pains and he has a spinal  cord stimulator as well.  He was diagnosed with complex regional pain syndrome.  Over the last several years he has had off-and-on night sweats but he has not noticed any significant change in that pattern recently.  He denies any recent weight loss.  No erythromelalgia.  Over the last couple of years he has noticed a rubbery movable grape sized subcutaneous lump above the left hand which has slowly increased in size.  It does not bother him.  No other new swellings.  Over the last few years he has noticed that he gets near syncope with excessive laughing.  He does have dyspnea on exertion/climbing his stairs.  He was a chronic smoker but quit 13 years ago but he continues to chew tobacco.  No GI problems.  No history of blood clots.  No abnormal bleeding.     Interval history    This is a video visit.  Overall he is doing well.  He has noticed slightly more energy as compared to previously although he has not been on testosterone since mid March.  He continues to chew tobacco.  No B symptoms.  No erythromelalgia.  He continues to take baby aspirin once daily.    ECOG 1    ROS:  A ROS was otherwise neg    I reviewed other history in epic as below.        Past Medical/Surgical History:  No past medical history on file.  Past Surgical History:   Procedure Laterality Date     BACK SURGERY      spine stimulator     EXCISE MASS TRUNK N/A 12/2/2020    Procedure: EXCISION SEBACEOUS CYST BACK;  Surgeon: Alberto Lopez MD;  Location: [facility] OR   Chicken pox 11 years ago  Pericarditis  Pleurisy  Epididymitis  Kidney stones  Spinal cord stimulator to control the pain      Allergies:  Allergies as of 05/30/2023     (No Known Allergies)     Current Medications:  Current Outpatient Medications   Medication Sig Dispense Refill     ascorbic acid (VITAMIN C) 1000 MG TABS Take 500 mg by mouth (Patient not taking: Reported on 5/2/2023)       Cholecalciferol 10 MCG (400 UNIT) CAPS Take 400 Units by mouth daily       DULoxetine  (CYMBALTA) 60 MG capsule Take 60 mg by mouth daily       Omega-3 Fatty Acids (FISH OIL OMEGA-3 PO)        oxyCODONE (ROXICODONE) 5 MG immediate release tablet Take 1-2 tablets by mouth every 4 hours if needed for Pain (max 8 per day)   USE DATES:116-6/15/2016       tiZANidine (ZANAFLEX) 4 MG tablet TAKE 1 TABLET BY MOUTH AT BEDTIME IF NEEDED FOR MUSCLE SPASM.       zolpidem (AMBIEN) 5 MG tablet Take 5 mg by mouth nightly as needed        Family History:  No family history on file.  No family history of bleeding or clotting disorder.  5 kids- all healthy    Social History:  Social History     Socioeconomic History     Marital status:      Spouse name: Not on file     Number of children: Not on file     Years of education: Not on file     Highest education level: Not on file   Occupational History     Not on file   Tobacco Use     Smoking status: Former     Packs/day: 1.00     Types: Cigarettes     Quit date:      Years since quittin.4     Smokeless tobacco: Current     Types: Chew   Vaping Use     Vaping status: Never Used   Substance and Sexual Activity     Alcohol use: No     Drug use: No     Sexual activity: Not on file   Other Topics Concern     Parent/sibling w/ CABG, MI or angioplasty before 65F 55M? Not Asked   Social History Narrative     Not on file     Social Determinants of Health     Financial Resource Strain: Not on file   Food Insecurity: Not on file   Transportation Needs: Not on file   Physical Activity: Not on file   Stress: Not on file   Social Connections: Not on file   Intimate Partner Violence: Not on file   Housing Stability: Not on file     Quit smoking 13 years ago. No etoh for 8 years. Lives with wife.       Physical Exam:  There were no vitals taken for this visit.   Wt Readings from Last 4 Encounters:   20 93.4 kg (206 lb)   20 92.1 kg (203 lb)   10/27/20 91.6 kg (202 lb)   10/26/18 93.4 kg (206 lb)       Constitutional.  Looks well and in no apparent  distress.   Eyes.  Without eye redness or apparent jaundice.   Respiratory.  Non labored breathing. Speaking in full sentences.    Skin.  No concerning skin rashes on the skin visualized.   Neurological.  Is alert and oriented.  Psychiatric.  Mood and affect seem appropriate.      The rest of a comprehensive physical examination is deferred due to Public Health Emergency video visit restrictions.      Laboratory/Imaging Studies  Reviewed   5/7/2023    CBC shows WBC 7.  Hemoglobin 18.5.  Platelets 226.    Erythropoietin 8.  Ferritin 273.  Iron 146.  TIBC 331.  Iron saturation index 44%.  Soluble transferrin receptor 2.5.  .  Uric acid 4.7.    Myeloproliferative neoplasm NGS: No mutations were identified in the analyzed gene regions of JAK2, CALR, or MPL genes.    4/30/2023  CBC shows Hg 17.9    BMP- unremarkable    2/17/2023     CBC shows hemoglobin 21.2.  Hematocrit 60.3 RBC count 6.32.  MCV 95  WBC 7.  Platelets 226  Chemistry is unremarkable.  Testosterone.  582 ng/dL    Looking back, he has had high hemoglobin in 2015 when it was 17.8.  Since then he has had high hemoglobin levels.    9/15/2022.  Hemoglobin was 18.4.  Hematocrit 55.1.  RBC count 6.67   ferritin 60  Testosterone 397    3/7/2023.  Hemochromatosis mutation testing shows that he is heterozygous for H63D mutation in HFE gene.    4/30/2023 CT CHEST PE STUDY    1.  No CT evidence for pulmonary embolism.   2.  Low lung volumes without infiltrates or pleural effusion.   3.  No mediastinal abnormalities.        EXAM: CT ABDOMEN AND PELVIS STONE PROTOCOL WITHOUT CONTRAST   LOCATION: [facility]   DATE/TIME: 01/29/2022, 3:27 PM     INDICATION: Flank pain, kidney stone suspected, left.   COMPARISON: 05/24/2020 CT.   TECHNIQUE: CT scan of the abdomen and pelvis was performed without oral or IV contrast. Multiplanar reformats were obtained. Dose reduction techniques were used.   CONTRAST: None.     FINDINGS:   LOWER CHEST: Lung bases are clear.      HEPATOBILIARY: Normal noncontrast appearance. No calcified gallstones.     PANCREAS: Normal.     SPLEEN: Normal.     ADRENAL GLANDS: Normal.     KIDNEY/BLADDER: Obstructing 4 mm distal left ureteral calculus pelvic portion left ureter with mild hydronephrosis. Three tiny 1-2 mm left intrarenal calculi and single 2 mm right intrarenal calculus. No right-sided hydronephrosis or ureteral calculus.     BOWEL: No obstruction or inflammatory change. Normal appendix. No ascites or free air.     LYMPH NODES: No lymphadenopathy.     VASCULATURE: No abdominal aortic aneurysm.     PELVIC ORGANS: No pelvic masses.     MUSCULOSKELETAL: No acute findings. Spinal neurostimulator.     IMPRESSION:   1.  Small 4 mm distal left ureteral calculus with mild hydronephrosis.   2.  A few tiny nonobstructing bilateral intrarenal calculi.   3.  No other inflammatory changes or acute findings within limitations of noncontrast exam.      ASSESSMENT/PLAN:    Polycythemia.  He has elevated hemoglobin, hematocrit and RBC count.  This has been going on since 2015.  He was started on testosterone replacement about 3 years ago and since then he has been getting off-and-on phlebotomy but hemoglobin continues to increase.  Last phlebotomy was in early March 2023.  He has a history of chronic smoking but quit 30 years ago but continues to chew tobacco.  Testosterone was stopped around mid March 2023.      Repeat testing showed hemoglobin 18.5.  Erythropoietin is 8 and normal.  Myeloproliferative neoplasm NGS panel is unremarkable.  LDH and uric acid are normal.  Previously he did not have any splenomegaly.    Currently he has no evidence of polycythemia vera.  I would recommend continue to hold testosterone and stop chewing tobacco.  Previously he was a smoker but quit many years ago.  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  Continue the aspirin once a day.  I would recommend to repeat labs in 4 months.    Heterozygous for H63D  mutation in the HFE gene.  Ferritin level in September 2022 was not elevated but he has done phlebotomy almost every 3 months over the last 3 years also.  Usually people with heterozygosity for H63D mutation will not have significant iron overload.  Most recent iron studies are in the normal range.  Ferritin is 273.  I would recommend checking iron studies every 4 months.  I told him to try not to donate blood for the next 4 months as this will give us a good picture how his body is retaining excess iron and how the hemoglobin/hematocrit is doing without phlebotomies.    Discussion regarding chewing tobacco.  Among other problems it can cause cancers of the oral cavity/larynx as well.  We again discussed that it is very important that he stops chewing tobacco.       Return to clinic in 4 months with labs prior.    All of his questions were answered to his satisfaction.  He is agreeable and comfortable with the plan.    Pau King MD

## 2024-01-23 ENCOUNTER — OFFICE VISIT (OUTPATIENT)
Dept: DERMATOLOGY | Facility: CLINIC | Age: 49
End: 2024-01-23
Payer: COMMERCIAL

## 2024-01-23 DIAGNOSIS — L82.1 SEBORRHEIC KERATOSES: Primary | ICD-10-CM

## 2024-01-23 DIAGNOSIS — D49.2 NEOPLASM OF UNSPECIFIED BEHAVIOR OF BONE, SOFT TISSUE, AND SKIN: ICD-10-CM

## 2024-01-23 PROCEDURE — 11102 TANGNTL BX SKIN SINGLE LES: CPT | Performed by: PHYSICIAN ASSISTANT

## 2024-01-23 PROCEDURE — 88305 TISSUE EXAM BY PATHOLOGIST: CPT | Mod: 26 | Performed by: DERMATOLOGY

## 2024-01-23 PROCEDURE — 11103 TANGNTL BX SKIN EA SEP/ADDL: CPT | Performed by: PHYSICIAN ASSISTANT

## 2024-01-23 PROCEDURE — 88305 TISSUE EXAM BY PATHOLOGIST: CPT | Mod: TC | Performed by: PHYSICIAN ASSISTANT

## 2024-01-23 RX ORDER — DULOXETIN HYDROCHLORIDE 30 MG/1
60 CAPSULE, DELAYED RELEASE ORAL DAILY
COMMUNITY
Start: 2023-11-11

## 2024-01-23 ASSESSMENT — PAIN SCALES - GENERAL: PAINLEVEL: NO PAIN (0)

## 2024-01-23 NOTE — PATIENT INSTRUCTIONS
Patient Education        Proper skin care from [facility]:     -Eliminate harsh soaps as they strip the natural oils from the skin, often resulting in dry itchy skin ( i.e. Dial, Zest, [place] Spring)  -Use mild soaps such as Cetaphil or Dove Sensitive Skin in the shower. You do not need to use soap on arms, legs, and trunk every time you shower unless visibly soiled.   -Avoid hot or cold showers.  -After showering, lightly dry off and apply moisturizing within 2-3 minutes. This will help trap moisture in the skin.   -Aggressive use of a moisturizer at least 1-2 times a day to the entire body (including -Vanicream, Cetaphil, Aquaphor or Cerave) and moisturize hands after every washing.  -We recommend using moisturizers that come in a tub that needs to be scooped out, not a pump. This has more of an oil base. It will hold moisture in your skin much better than a water base moisturizer. The above recommended are non-pore clogging.        Wear a sunscreen with at least SPF 30 on your face, ears, neck and V of the chest daily. Wear sunscreen on other areas of the body if those areas are exposed to the sun throughout the day. Sunscreens can contain physical and/or chemical blockers. Physical blockers are less likely to clog pores, these include zinc oxide and titanium dioxide. Reapply every two hour and after swimming.      Sunscreen examples: https://www.ewg.org/sunscreen/     UV radiation  UVA radiation remains constant throughout the day and throughout the year. It is a longer wavelength than UVB and therefore penetrates deeper into the skin leading to immediate and delayed tanning, photoaging, and skin cancer. 70-80% of UVA and UVB radiation occurs between the hours of 10am-2pm.  UVB radiation  UVB radiation causes the most harmful effects and is more significant during the summer months. However, snow and ice can reflect UVB radiation leading to skin damage during the winter months as well. UVB radiation is  responsible for tanning, burning, inflammation, delayed erythema (pinkness), pigmentation (brown spots), and skin cancer.      I recommend self monthly full body exams and yearly full body exams with a dermatology provider. If you develop a new or changing lesion please follow up for examination. Most skin cancers are pink and scaly or pink and pearly. However, we do see blue/brown/black skin cancers.  Consider the ABCDEs of melanoma when giving yourself your monthly full body exam ( don't forget the groin, buttocks, feet, toes, etc). A-asymmetry, B-borders, C-color, D-diameter, E-elevation or evolving. If you see any of these changes please follow up in clinic. If you cannot see your back I recommend purchasing a hand held mirror to use with a larger wall mirror.       Checking for Skin Cancer  You can find cancer early by checking your skin each month. There are 3 kinds of skin cancer. They are melanoma, basal cell carcinoma, and squamous cell carcinoma. Doing monthly skin checks is the best way to find new marks or skin changes. Follow the instructions below for checking your skin.   The ABCDEs of checking moles for melanoma   Check your moles or growths for signs of melanoma using ABCDE:   Asymmetry: the sides of the mole or growth don t match  Border: the edges are ragged, notched, or blurred  Color: the color within the mole or growth varies  Diameter: the mole or growth is larger than 6 mm (size of a pencil eraser)  Evolving: the size, shape, or color of the mole or growth is changing (evolving is not shown in the images below)    Checking for other types of skin cancer  Basal cell carcinoma or squamous cell carcinoma have symptoms such as:      A spot or mole that looks different from all other marks on your skin  Changes in how an area feels, such as itching, tenderness, or pain  Changes in the skin's surface, such as oozing, bleeding, or scaliness  A sore that does not heal  New swelling or redness beyond  the border of a mole     Who s at risk?  Anyone can get skin cancer. But you are at greater risk if you have:   Fair skin, light-colored hair, or light-colored eyes  Many moles or abnormal moles on your skin  A history of sunburns from sunlight or tanning beds  A family history of skin cancer  A history of exposure to radiation or chemicals  A weakened immune system  If you have had skin cancer in the past, you are at risk for recurring skin cancer.   How to check your skin  Do your monthly skin checkups in front of a full-length mirror. Check all parts of your body, including your:   Head (ears, face, neck, and scalp)  Torso (front, back, and sides)  Arms (tops, undersides, upper, and lower armpits)  Hands (palms, backs, and fingers, including under the nails)  Buttocks and genitals  Legs (front, back, and sides)  Feet (tops, soles, toes, including under the nails, and between toes)  If you have a lot of moles, take digital photos of them each month. Make sure to take photos both up close and from a distance. These can help you see if any moles change over time.   Most skin changes are not cancer. But if you see any changes in your skin, call your doctor right away. Only he or she can diagnose a problem. If you have skin cancer, seeing your doctor can be the first step toward getting the treatment that could save your life.   [employer] last reviewed this educational content on 4/1/2019 2000-2020 [employer]. [address] [place] [address], [place] [address]. All rights reserved. This information is not intended as a substitute for professional medical care. Always follow your healthcare professional's instructions.        When should I call my doctor?  If you are worsening or not improving, please, contact us or seek urgent care as noted below.      Who should I call with questions (adults)?  [facility] (adult and pediatric): [phone]  [facility]  [place] (adult): [phone]  [facility] ([place], [place], [place] and [place]) [phone]  For urgent needs outside of business hours call the [facility] at [phone] and ask for the dermatology resident on call to be paged  If this is a medical emergency and you are unable to reach an ER, Call 911        If you need a prescription refill, please contact your pharmacy. Refills are approved or denied by our Physicians during normal business hours, Monday through Fridays  Per office policy, refills will not be granted if you have not been seen within the past year (or sooner depending on your child's condition)     Wound Care After a Biopsy    What is a skin biopsy?  A skin biopsy allows the doctor to examine a very small piece of tissue under the microscope to determine the diagnosis and the best treatment for the skin condition. A local anesthetic (numbing medicine) is injected with a very small needle into the skin area to be tested. A small piece of skin is taken from the area. Sometimes a suture (stitch) is used.     What are the risks of a skin biopsy?  I will experience scar, bleeding, swelling, pain, crusting and redness. I may experience incomplete removal or recurrence. Risks of this procedure are excessive bleeding, bruising, infection, nerve damage, numbness, thick (hypertrophic or keloidal) scar and non-diagnostic biopsy.    How should I care for my wound for the first 24 hours?  Keep the wound dry and covered for 24 hours  If it bleeds, hold direct pressure on the area for 15 minutes. If bleeding does not stop, call us or go to the emergency room  Avoid strenuous exercise the first 1-2 days or as your doctor instructs you    How should I care for the wound after 24 hours?  After 24 hours, remove the bandage  You may bathe or shower as normal  If you had a scalp biopsy, you can shampoo as usual and can use shower water to clean the biopsy site  daily  Clean the wound once a day with gentle soap and water  Do not scrub, be gentle  Apply white petroleum/Vaseline after cleaning the wound with a cotton swab or a clean finger, and keep the site covered with a Bandaid /bandage. Bandages are not necessary with a scalp biopsy  If you are unable to cover the site with a Bandaid /bandage, re-apply ointment 2-3 times a day to keep the site moist. Moisture will help with healing  Avoid strenuous activity for first 1-2 days  Avoid lakes, rivers, pools, and oceans until the stitches are removed or the site is healed    How do I clean my wound?  Wash hands thoroughly with soap or use hand  before all wound care  Clean the wound with gentle soap and water  Apply white petroleum/Vaseline  to wound after it is clean  Replace the Bandaid /bandage to keep the wound covered for the first few days or as instructed by your doctor  If you had a scalp biopsy, warm shower water to the area on a daily basis should suffice    What should I use to clean my wound?   Cotton-tipped applicators (Qtips )  White petroleum jelly (Vaseline ). Use a clean new container and use Q-tips to apply.  Bandaids  as needed  Gentle soap     How should I care for my wound long term?  Do not get your wound dirty  Keep up with wound care for one week or until the area is healed.  If you have stitches, stitches need to be removed in 14 days. You may return to our clinic for this or you may have it done locally at your doctor s office.  A small scab will form and fall off by itself when the area is completely healed. The area will be red and will become pink in color as it heals. Sun protection is very important for how your scar will turn out. Sunscreen with an SPF 30 or greater is recommended once the area is healed.  You should have some soreness but it should be mild and slowly go away over several days. Talk to your doctor about using tylenol for pain,    When should I call my doctor?  If you  have increased:   Pain or swelling  Pus or drainage (clear or slightly yellow drainage is ok)  Temperature over 100F  Spreading redness or warmth around wound    When will I hear about my results?  The biopsy results can take 2 weeks to come back.  Your results will automatically release to [employer] before your provider has even reviewed them.  The clinic will call you with the results, send you a [employer] message, or have you schedule a follow-up clinic or phone time to discuss the results.  Contact our clinics if you do not hear from us in 2 weeks.    Who should I call with questions?  [facility]: [phone]  [facility]: [phone]  For urgent needs outside of business hours call the [facility] at [phone] and ask for the dermatology resident on call

## 2024-01-23 NOTE — NURSING NOTE
Dermatology Rooming Note    Carloz Nicolas's goals for this visit include:   Chief Complaint   Patient presents with    Derm Problem     Carloz is here today for a lesion of concern on the left forearm and 3 on the torso      Adán BLAKELY CMA

## 2024-01-23 NOTE — NURSING NOTE
Lidocaine-epinephrine 1-1:966673 % injection   2mL once for one use, starting 1/23/2024 ending 1/23/2024,  2mL disp, R-0, injection  Injected by Adán BLAKELY CMA

## 2024-01-23 NOTE — PROGRESS NOTES
[facility] Dermatology Note  Encounter Date: Jan 23, 2024  Office Visit     Reviewed patients past medical history and pertinent chart review prior to patients visit today.     Dermatology Problem List:  NUB x3, left posterior shoulder, right lower chest, left posterior forearm, shave biopsy 1/23/2024     ____________________________________________    Assessment & Plan:     # Neoplasm of uncertain behavior:  left posterior shoulder  DDx includes BCC vs extranumerary nipple. Shave biopsy today.  # Neoplasm of uncertain behavior:  right lower chest  DDx includes BCC vs SCC. Shave biopsy today.  # Neoplasm of uncertain behavior:  left posterior forearm  DDx includes SCC vs hypertrophic actinic keratosis. Shave biopsy today.    Procedure Note: Biopsy by shave technique x3  The risks and benefits of the procedure were described to the patient. These include but are not limited to bleeding, infection, scar, incomplete removal, and non-diagnostic biopsy. Verbal informed consent was obtained. The above site(s) was cleansed with an alcohol pad and injected with 1% lidocaine with epinephrine. Once anesthesia was obtained, a biopsy(ies) was performed with Gilette blade. The tissue(s) was placed in a labeled container(s) with formalin and sent to pathology. Hemostasis was achieved with aluminum chloride. Vaseline and a bandage were applied to the wound(s). The patient tolerated the procedure well and was given post biopsy care instructions.    #Seborrheic keratoses  We discussed the benign nature of the skin lesions. No treatment is required. I recommend continued observation with follow up should any concerning changes arise.     All risks, benefits and alternatives were discussed with patient.  Patient is in agreement and understands the assessment and plan.  All questions were answered.  Teresa Daniels PA-C  [facility] Dermatology  _______________________________________    CC: Derm Problem (Carloz  is here today for a lesion of concern on the left forearm and 3 on the torso )    HPI:  Mr. Carloz Nicolas is a(n) 48 year old male who presents today as a new patient for multiple skin lesions. In the past year the patient has noticed skin lesions on his abdomen and left forearm. The lesions are itchy, the spot on his left arm is occasionally tender. Patient is otherwise feeling well, without additional skin concerns. He worked outdoors for many years.     Physical Exam:  SKIN: Waist-up skin, which includes the head/face, neck, both arms, chest, back, abdomen, digits and/or nails was examined.  - Waxy, stuck on appearing papule(s) throughout exam, consistent with seborrheic keraotses.   - Involving the left posterior shoulder is a 0.6 x 0.9 cm pink papule with arborizing telangiectasias.  -  Involving the right lower chest 0.4 x 0.4 cm pink papule with arborizing telangiectasias.  - Involving the left posterior forearms is a 0.4 x 0.4 cm pink papule with adherent scale.   - Waxy, stuck on appearing papule(s) throughout exam, consistent with seborrheic keraotses.       - No other lesions of concern on areas examined.     Medications:  Current Outpatient Medications   Medication    DULoxetine (CYMBALTA) 30 MG capsule    oxyCODONE (ROXICODONE) 5 MG immediate release tablet    zolpidem (AMBIEN) 5 MG tablet    ascorbic acid (VITAMIN C) 1000 MG TABS    Cholecalciferol 10 MCG (400 UNIT) CAPS    DULoxetine (CYMBALTA) 60 MG capsule    Omega-3 Fatty Acids (FISH OIL OMEGA-3 PO)    tiZANidine (ZANAFLEX) 4 MG tablet     No current facility-administered medications for this visit.      Past Medical History:   Patient Active Problem List   Diagnosis    Chronic pain of both shoulders    Complex regional pain syndrome type 1 of right lower extremity    Bilateral carpal tunnel syndrome    Neck pain    Sebaceous cyst    Back pain at L4-L5 level    Chicken pox pneumonia    Controlled substance agreement signed    Drug-induced erectile  dysfunction    Epicondylitis, lateral, right    Facet degeneration of lumbar region    Hyperlipidemia    Lumbar radicular pain    Paresthesia of left arm    Post herpetic neuralgia    Right flank pain    Right ureteral stone    Solitary pulmonary nodule    Thrombocytopenia, unspecified (H24)     No past medical history on file.    CC Referred Self, MD  No address on file on close of this encounter.

## 2024-01-23 NOTE — LETTER
1/23/2024       RE: Carloz Nicolas  [address]  [place] [address]     Dear Colleague,    Thank you for referring your patient, Carloz Nicolas, to the [facility] [place] [facility]. Please see a copy of my visit note below.    [facility] Dermatology Note  Encounter Date: Jan 23, 2024  Office Visit     Reviewed patients past medical history and pertinent chart review prior to patients visit today.     Dermatology Problem List:  NUB x3, left posterior shoulder, right lower chest, left posterior forearm, shave biopsy 1/23/2024     ____________________________________________    Assessment & Plan:     # Neoplasm of uncertain behavior:  left posterior shoulder  DDx includes BCC vs extranumerary nipple. Shave biopsy today.  # Neoplasm of uncertain behavior:  right lower chest  DDx includes BCC vs SCC. Shave biopsy today.  # Neoplasm of uncertain behavior:  left posterior forearm  DDx includes SCC vs hypertrophic actinic keratosis. Shave biopsy today.    Procedure Note: Biopsy by shave technique x3  The risks and benefits of the procedure were described to the patient. These include but are not limited to bleeding, infection, scar, incomplete removal, and non-diagnostic biopsy. Verbal informed consent was obtained. The above site(s) was cleansed with an alcohol pad and injected with 1% lidocaine with epinephrine. Once anesthesia was obtained, a biopsy(ies) was performed with Gilette blade. The tissue(s) was placed in a labeled container(s) with formalin and sent to pathology. Hemostasis was achieved with aluminum chloride. Vaseline and a bandage were applied to the wound(s). The patient tolerated the procedure well and was given post biopsy care instructions.    #Seborrheic keratoses  We discussed the benign nature of the skin lesions. No treatment is required. I recommend continued observation with follow up should any  concerning changes arise.     All risks, benefits and alternatives were discussed with patient.  Patient is in agreement and understands the assessment and plan.  All questions were answered.  Teresa Daniels PA-C  [facility]at[facility]  _______________________________________    CC: Derm Problem (Carloz is here today for a lesion of concern on the left forearm and 3 on the torso )    HPI:  Mr. Carloz Nicolas is a(n) 48 year old male who presents today as a new patient for multiple skin lesions. In the past year the patient has noticed skin lesions on his abdomen and left forearm. The lesions are itchy, the spot on his left arm is occasionally tender. Patient is otherwise feeling well, without additional skin concerns. He worked outdoors for many years.     Physical Exam:  SKIN: Waist-up skin, which includes the head/face, neck, both arms, chest, back, abdomen, digits and/or nails was examined.  - Waxy, stuck on appearing papule(s) throughout exam, consistent with seborrheic keraotses.   - Involving the left posterior shoulder is a 0.6 x 0.9 cm pink papule with arborizing telangiectasias.  -  Involving the right lower chest 0.4 x 0.4 cm pink papule with arborizing telangiectasias.  - Involving the left posterior forearms is a 0.4 x 0.4 cm pink papule with adherent scale.   - Waxy, stuck on appearing papule(s) throughout exam, consistent with seborrheic keraotses.       - No other lesions of concern on areas examined.     Medications:  Current Outpatient Medications   Medication    DULoxetine (CYMBALTA) 30 MG capsule    oxyCODONE (ROXICODONE) 5 MG immediate release tablet    zolpidem (AMBIEN) 5 MG tablet    ascorbic acid (VITAMIN C) 1000 MG TABS    Cholecalciferol 10 MCG (400 UNIT) CAPS    DULoxetine (CYMBALTA) 60 MG capsule    Omega-3 Fatty Acids (FISH OIL OMEGA-3 PO)    tiZANidine (ZANAFLEX) 4 MG tablet     No current facility-administered medications for this visit.      Past Medical History:   Patient Active  Problem List   Diagnosis    Chronic pain of both shoulders    Complex regional pain syndrome type 1 of right lower extremity    Bilateral carpal tunnel syndrome    Neck pain    Sebaceous cyst    Back pain at L4-L5 level    Chicken pox pneumonia    Controlled substance agreement signed    Drug-induced erectile dysfunction    Epicondylitis, lateral, right    Facet degeneration of lumbar region    Hyperlipidemia    Lumbar radicular pain    Paresthesia of left arm    Post herpetic neuralgia    Right flank pain    Right ureteral stone    Solitary pulmonary nodule    Thrombocytopenia, unspecified (H24)     No past medical history on file.    CC Referred Self, MD  No address on file on close of this encounter.

## 2024-01-24 LAB
PATH REPORT.COMMENTS IMP SPEC: ABNORMAL
PATH REPORT.COMMENTS IMP SPEC: ABNORMAL
PATH REPORT.COMMENTS IMP SPEC: YES
PATH REPORT.FINAL DX SPEC: ABNORMAL
PATH REPORT.GROSS SPEC: ABNORMAL
PATH REPORT.MICROSCOPIC SPEC OTHER STN: ABNORMAL
PATH REPORT.RELEVANT HX SPEC: ABNORMAL

## 2024-01-25 ENCOUNTER — TELEPHONE (OUTPATIENT)
Dept: DERMATOLOGY | Facility: CLINIC | Age: 49
End: 2024-01-25
Payer: COMMERCIAL

## 2024-01-25 DIAGNOSIS — C44.519 BCC (BASAL CELL CARCINOMA), TRUNK: Primary | ICD-10-CM

## 2024-01-25 NOTE — TELEPHONE ENCOUNTER
If you would like to schedule afollow up with the dermatology clinic, please call us at [phone].

## 2024-01-25 NOTE — TELEPHONE ENCOUNTER
Left patient a voicemail to schedule an excision for BCC R lower chest, BCC L posterior shoulder with Dr. Damon or Dr. Ortega. Provided my direct phone number.    Shama Skaggs on 1/25/2024 at 2:56 PM

## 2024-01-30 NOTE — TELEPHONE ENCOUNTER
Left patient a voicemail to schedule an excision for BCC R lower chest, BCC L posterior shoulder with Dr. Damon or Dr. Ortega. Provided my direct phone number.    Shama Skaggs, Procedure  1/30/2024 3:05 PM

## 2024-02-01 NOTE — TELEPHONE ENCOUNTER
Left patient a voicemail to schedule an excision for BCC R lower chest, BCC L posterior shoulder with Dr. Damon or Dr. Ortega. Provided my direct phone number.         Shama Skaggs, Procedure  2/1/2024 11:13 AM

## 2024-02-12 NOTE — TELEPHONE ENCOUNTER
Left vm with direct ph# and reminder to reply in MyChart.     Shama Skaggs, Procedure  2/12/2024 1:37 PM

## 2024-02-14 NOTE — TELEPHONE ENCOUNTER
I have been unable to reach this patient for scheduling their appt with Derm Surg.     I am canceling the referral from my WQ because I have hit the maximum number of attempts to contact them. I will reinstate the order and help them schedule if they return my messages.     Thank you,   Shama Skaggs, Procedure  2/14/2024 2:55 PM

## 2024-02-20 NOTE — TELEPHONE ENCOUNTER
Pt left a vm last night requested to schedule for excision for BCC R lower chest, BCC L posterior shoulder. Pt requested after lunch and no sooner than April.     Scheduled for next available and replied via My Chart.     Shama Skaggs, Procedure  2/20/2024 8:07 AM

## 2024-04-24 ENCOUNTER — TELEPHONE (OUTPATIENT)
Dept: DERMATOLOGY | Facility: CLINIC | Age: 49
End: 2024-04-24

## 2024-04-24 ENCOUNTER — OFFICE VISIT (OUTPATIENT)
Dept: DERMATOLOGY | Facility: CLINIC | Age: 49
End: 2024-04-24
Payer: COMMERCIAL

## 2024-04-24 VITALS — HEART RATE: 94 BPM | SYSTOLIC BLOOD PRESSURE: 110 MMHG | DIASTOLIC BLOOD PRESSURE: 76 MMHG

## 2024-04-24 DIAGNOSIS — C44.619 BCC (BASAL CELL CARCINOMA), SHOULDER, LEFT: ICD-10-CM

## 2024-04-24 DIAGNOSIS — C44.519 BASAL CELL CARCINOMA OF TRUNCAL SKIN: Primary | ICD-10-CM

## 2024-04-24 PROCEDURE — 88305 TISSUE EXAM BY PATHOLOGIST: CPT | Mod: 26 | Performed by: DERMATOLOGY

## 2024-04-24 PROCEDURE — 11603 EXC TR-EXT MAL+MARG 2.1-3 CM: CPT | Mod: GC | Performed by: DERMATOLOGY

## 2024-04-24 PROCEDURE — 11602 EXC TR-EXT MAL+MARG 1.1-2 CM: CPT | Mod: GC | Performed by: DERMATOLOGY

## 2024-04-24 PROCEDURE — 12034 INTMD RPR S/TR/EXT 7.6-12.5: CPT | Mod: GC | Performed by: DERMATOLOGY

## 2024-04-24 PROCEDURE — 88305 TISSUE EXAM BY PATHOLOGIST: CPT | Mod: TC | Performed by: DERMATOLOGY

## 2024-04-24 ASSESSMENT — PAIN SCALES - GENERAL: PAINLEVEL: NO PAIN (0)

## 2024-04-24 NOTE — TELEPHONE ENCOUNTER
Follow up call attempted & left voicemail following excision procedure with Dr. Damon.       Are you having pain?   Are you taking pain medication?   Are you applying ice?    Have you had any noticeable bleeding through the bandage?    Do you have any other concerns?        Please call [phone] if you have any questions or concerns.

## 2024-04-24 NOTE — LETTER
4/24/2024       RE: Carloz Nicolas  [address]  [place] [address]     Dear Colleague,    Thank you for referring your patient, Carloz Nicolas, to the [facility] [place] [facility]. Please see a copy of my visit note below.    DERMATOLOGY EXCISION PROCEDURE NOTE    Dermatology Problem List:    NMSC  - nBCC, right lower chest, s/p shave 1/23/24, s/p exc 4/24/24  - nBCC, left posterior shoulder, s/p shave 1/23/24, s/p exc 4/24/24  AK  - Lichenoid AK, left posterior forearm, s/p shave 1/23/24    Case 1:   NAME OF PROCEDURE: Excision intermediate layered linear closure  Staff surgeon: Ghulam Damon MD    PRE-OPERATIVE DIAGNOSIS:  Basal Cell Carcinoma  POST-OPERATIVE DIAGNOSIS: Same   LOCATION: R Lower Chest   FINAL EXCISION SIZE(DEFECT SIZE): 1.8 x 1.6 cm  MARGIN: 0.4 cm  FINAL REPAIR LENGTH: 4.0 cm   ANESTHESIA: 9 cc Lidocaine 1% with epi 1 : 100,000      INDICATIONS: This patient presented with a 1.0 x 0.8 cm Basal Cell Carcinoma. Excision was indicated. We discussed the principles of treatment and most likely complications including scarring, bleeding, infection, incomplete excision, wound dehiscence, pain, nerve damage, and recurrence. Informed consent was obtained and the patient underwent the procedure as follows:    PROCEDURE: The patient was taken to the operative suite. Time-out was performed.  The treatment area was anesthetized with 1% lidocaine with epinephrine. The area was prepped with Chlorhexidine and rinsed with sterile saline and draped with sterile towels. The lesion was delineated and excised down to subcutaneous fat in a elliptical manner. Hemostasis was obtained by electrocoagulation.     REPAIR: An intermediate layered linear closure was selected as the procedure which would maximally preserve both function and cosmesis.    After the excision of the tumor, the area was carefully undermined. Hemostasis was obtained  with electrocoagulation.  Closure was oriented so that the wound was in the patient's natural skin tension lines. The subcutaneous and dermal layers were then closed with 4-0 monocryl sutures. The epidermis was then carefully approximated along the length of the wound using 4-0 Monocryl  running subcuticular sutures.     Case 2:   NAME OF PROCEDURE: Excision intermediate layered linear closure  Staff surgeon: Ghulam Damon MD    PRE-OPERATIVE DIAGNOSIS:  Basal Cell Carcinoma  POST-OPERATIVE DIAGNOSIS: Same   LOCATION: L Posterior Shoulder  FINAL EXCISION SIZE(DEFECT SIZE): 2.3 x 1.5 cm  MARGIN: 0.4 cm  FINAL REPAIR LENGTH: 5.5 cm   ANESTHESIA: 9 cc Lidocaine 1% with epi 1 : 100,000      INDICATIONS: This patient presented with a 1.5 x 0.8 cm Basal Cell Carcinoma. Excision was indicated. We discussed the principles of treatment and most likely complications including scarring, bleeding, infection, incomplete excision, wound dehiscence, pain, nerve damage, and recurrence. Informed consent was obtained and the patient underwent the procedure as follows:    PROCEDURE: The patient was taken to the operative suite. Time-out was performed.  The treatment area was anesthetized with 1% lidocaine with epinephrine. The area was prepped with Chlorhexidine and rinsed with sterile saline and draped with sterile towels. The lesion was delineated and excised down to subcutaneous fat in a elliptical manner. Hemostasis was obtained by electrocoagulation.     REPAIR: An intermediate layered linear closure was selected as the procedure which would maximally preserve both function and cosmesis.    After the excision of the tumor, the area was carefully undermined. Hemostasis was obtained with electrocoagulation.  Closure was oriented so that the wound was in the patient's natural skin tension lines. The subcutaneous and dermal layers were then closed with 4-0 monocryl sutures. The epidermis was then carefully approximated along the length  of the wound using 4-0 Monocryl running subcuticular sutures.         Estimated blood loss was less than 10 ml for all surgical sites. A sterile pressure dressing was applied and wound care instructions, with a written handout, were given. The patient was discharged from the [facility] Center alert and ambulatory.    The patient elected for pathology results to automatically release and understands that the clinical staff will contact them as soon as possible to notify them of the results.    Dr. Ghulam Damon performed the procedures.    Anatomic Pathology Results: pending    Clinical Follow-Up:   Future Appointments   Date Time Provider Department Center   1/24/2025  2:30 PM Teresa Daniels PA-C [facility]       Staff Involved:   Scribe/Staff    Scribe Disclosure:   I, LUANN MANZANARES, am serving as a scribe; to document services personally performed by Ghulam Damon MD -based on data collection and the provider's statements to me.       Attending attestation:  I was present for key elements of the procedure and immediately available for all other portions of the procedure.  I have reviewed the note and edited it as necessary.    Ghulam Damon M.D.  Professor  Director of Dermatologic Surgery  Department of Dermatology  [facility]    Dermatology Surgery Clinic  [facility] [place] [address]'

## 2024-04-24 NOTE — PROGRESS NOTES
DERMATOLOGY EXCISION PROCEDURE NOTE    Dermatology Problem List:    NMSC  - nBCC, right lower chest, s/p shave 1/23/24, s/p exc 4/24/24  - nBCC, left posterior shoulder, s/p shave 1/23/24, s/p exc 4/24/24  AK  - Lichenoid AK, left posterior forearm, s/p shave 1/23/24    Case 1:   NAME OF PROCEDURE: Excision intermediate layered linear closure  Staff surgeon: Ghulam Damon MD    PRE-OPERATIVE DIAGNOSIS:  Basal Cell Carcinoma  POST-OPERATIVE DIAGNOSIS: Same   LOCATION: R Lower Chest   FINAL EXCISION SIZE(DEFECT SIZE): 1.8 x 1.6 cm  MARGIN: 0.4 cm  FINAL REPAIR LENGTH: 4.0 cm   ANESTHESIA: 9 cc Lidocaine 1% with epi 1 : 100,000      INDICATIONS: This patient presented with a 1.0 x 0.8 cm Basal Cell Carcinoma. Excision was indicated. We discussed the principles of treatment and most likely complications including scarring, bleeding, infection, incomplete excision, wound dehiscence, pain, nerve damage, and recurrence. Informed consent was obtained and the patient underwent the procedure as follows:    PROCEDURE: The patient was taken to the operative suite. Time-out was performed.  The treatment area was anesthetized with 1% lidocaine with epinephrine. The area was prepped with Chlorhexidine and rinsed with sterile saline and draped with sterile towels. The lesion was delineated and excised down to subcutaneous fat in a elliptical manner. Hemostasis was obtained by electrocoagulation.     REPAIR: An intermediate layered linear closure was selected as the procedure which would maximally preserve both function and cosmesis.    After the excision of the tumor, the area was carefully undermined. Hemostasis was obtained with electrocoagulation.  Closure was oriented so that the wound was in the patient's natural skin tension lines. The subcutaneous and dermal layers were then closed with 4-0 monocryl sutures. The epidermis was then carefully approximated along the length of the wound using 4-0 Monocryl  running subcuticular  sutures.     Case 2:   NAME OF PROCEDURE: Excision intermediate layered linear closure  Staff surgeon: Ghulam Damon MD    PRE-OPERATIVE DIAGNOSIS:  Basal Cell Carcinoma  POST-OPERATIVE DIAGNOSIS: Same   LOCATION: L Posterior Shoulder  FINAL EXCISION SIZE(DEFECT SIZE): 2.3 x 1.5 cm  MARGIN: 0.4 cm  FINAL REPAIR LENGTH: 5.5 cm   ANESTHESIA: 9 cc Lidocaine 1% with epi 1 : 100,000      INDICATIONS: This patient presented with a 1.5 x 0.8 cm Basal Cell Carcinoma. Excision was indicated. We discussed the principles of treatment and most likely complications including scarring, bleeding, infection, incomplete excision, wound dehiscence, pain, nerve damage, and recurrence. Informed consent was obtained and the patient underwent the procedure as follows:    PROCEDURE: The patient was taken to the operative suite. Time-out was performed.  The treatment area was anesthetized with 1% lidocaine with epinephrine. The area was prepped with Chlorhexidine and rinsed with sterile saline and draped with sterile towels. The lesion was delineated and excised down to subcutaneous fat in a elliptical manner. Hemostasis was obtained by electrocoagulation.     REPAIR: An intermediate layered linear closure was selected as the procedure which would maximally preserve both function and cosmesis.    After the excision of the tumor, the area was carefully undermined. Hemostasis was obtained with electrocoagulation.  Closure was oriented so that the wound was in the patient's natural skin tension lines. The subcutaneous and dermal layers were then closed with 4-0 monocryl sutures. The epidermis was then carefully approximated along the length of the wound using 4-0 Monocryl running subcuticular sutures.         Estimated blood loss was less than 10 ml for all surgical sites. A sterile pressure dressing was applied and wound care instructions, with a written handout, were given. The patient was discharged from the Dermatologic Surgery Center alert  and ambulatory.    The patient elected for pathology results to automatically release and understands that the clinical staff will contact them as soon as possible to notify them of the results.    Dr. Ghulam Damon performed the procedures.    Anatomic Pathology Results: pending    Clinical Follow-Up:   Future Appointments   Date Time Provider Department Center   1/24/2025  2:30 PM Teresa Daniels PA-C [facility]       Staff Involved:   Scribe/Staff    Scribe Disclosure:   I, LUANN MANZANARES, am serving as a scribe; to document services personally performed by Ghulam Damon MD -based on data collection and the provider's statements to me.       Attending attestation:  I was present for key elements of the procedure and immediately available for all other portions of the procedure.  I have reviewed the note and edited it as necessary.    Ghulam Damon M.D.  Professor  Director of Dermatologic Surgery  Department of Dermatology  [facility]    Dermatology Surgery Clinic  [facility] [address] [address]

## 2024-04-24 NOTE — PATIENT INSTRUCTIONS
Wound care    I will experience scar, bleeding, swelling, pain, crusting and redness. I may experience incomplete removal or recurrence. Risks are bleeding, bruising, swelling, infection, nerve damage, & a large wound. A second procedure may be recommended to obtain the best cosmetic or functional result.       A three month office visit with your Surgeon is recommended for scar evaluation. Please reach out sooner if you have concerns about you surgical site/wound.    Caring for your skin after surgery    After your surgery, a pressure bandage will be placed over the area that has stitches. This is important to prevent bleeding. Please follow these instructions over the next 1 to 2 weeks. Following this regimen will help to prevent complications as your wound heals.     For the first 48 hours after your surgery:    Leave the pressure dressing on and keep it dry. If it should come loose, you may re-tape it, but do not take it off.  Relax and take it easy. Do not do any vigorous exercise or heavy lifting. This could cause the wound to bleed.  Post-Operative pain is usually mild. If you are able to take tylenol, You may take plain or extra-strength Tylenol (acetaminophen) As directed on the bottle (do not take more than 4,000mg in one day). If you are able to take ibuprofen, you can alternate the tylenol and ibuprofen.   Avoid alcohol as this may increase your tendency to bleed.   You may put an ice pack around the bandaged area for 20 minutes at a time as needed. This may help reduce swelling, bruising, and pain. Make sure the ice pack is waterproof so that the pressure bandage doesn t get wet.  If the wound is on the face try to sleep with your head elevated. Either in a recliner or propped up in bed, this will decrease swelling around the eyes.   You may see a small amount of drainage or blood on your pressure bandage. This is normal. However:  If drainage or bleeding continues or saturates the bandage, you will  "need to apply firm pressure over the bandage with a piece of gauze for 15 minutes.  If bleeding continues after applying pressure for 15 minutes, apply an ice pack to the bandaged area for 15 minutes.  If bleeding still continues, call our office or go to the nearest emergency room.    Remove pressure dressing 48 hours after surgery:    Carefully remove the pressure bandage. If it seems sticky or too difficult to get off, you may need to soak it off in the shower.  After the pressure dressing is removed, you may shower and get the wound wet. However, Do Not let the forceful stream of the shower hit the wound directly.  Follow these wound care and dressing change instructions:    You have steri strips, skin glue (purplish/clear), and tegaderm (clear film) over your incision line, you can shower.   You may allow water to run over the site. Do not soak.  Do Not rub or scrub the site.  Pat dry after the shower or bath.  Avoid topical medications, lotions, creams, ointment,or oils.  Do not use tanning lamps or expose the site to sun.   Check wound appearance daily, some swelling and redness is normal after a procedure but should go away as your would is healing. If the swelling and redness or pain increases or if any other signs of infection occur listed below please send in a photo via my chart and or call us to let us know.  The clear film should start peeling off approximately around 2 weeks. By this time your wound should be sufficiently healed. If it still looks to be healing when the glue comes off you can clean the wound with soapy warm water daily in the shower and apply Vaseline to the incision line.   Once the clear film starts peeling off to the point where water is getting trapped under the clear film, please gently peel off.        If you are able to take acetaminophen (\"Tylenol,\" etc.) and ibuprofen (\"Advil\" or \"Motrin,\" etc.), then you may STAGGER these medications by taking 400 mg of ibuprofen (usually " two tabs) every 8 hours and 1,000 mg of acetaminophen (e.g., two tabs of extra-strength Tylenol) every 8 hours.    This means, for example, that you could take the followin,000 mg of Tylenol, followed 4 hours later by 400 mg Ibuprofen, followed 4 hours later with 1,000 mg of Tylenol, followed 4 hours later by 400 mg Ibuprofen, followed 4 hours later with 1,000 mg of Tylenol, and so forth.     Essentially, you can take either 1,000 mg of Tylenol or 400 mg of ibuprofen in alternating fashion EVERY FOUR HOURS.    Do NOT exceed more than 4,000 mg of Tylenol or 3,200 mg of ibuprofen per 24 hours. If you are not able to take Tylenol or ibuprofen as above due to other health issues (or a physician has told you directly that you are not allowed to take one of them, say due to pre-existing severe liver or kidney issues), then disregard the above directions.    Scientific evidence supports that this combination/schedule of pain medications is just as effective, if not more effective, than taking a narcotic pain medicine.       Follow up will be a 3 month scar evaluation either in person or via a telephone visit with you sending in a photo via [employer]. Unless you have been told to follow up sooner or if you have concerns and would like to be see sooner. Please call or send us in a [employer] message if possible and attach a photo.        What to expect:    The first couple of days your wound may be tender and may bleed slightly when doing wound care.  There may be swelling and bruising around the wound, especially if it is near the eyes. For your comfort, you may apply ice or cold compresses to the bruises after your have removed the pressure bandage.  The area around your wound may be numb for several weeks or even months.  You may experience periodic sharp pain or mild itching around the wound as it heals.   The suture line will look dark for a while but will lighten over time.       When to call us:    You have bleeding  that will not stop after applying pressure and ice.  You have pain that is not controlled with Tylenol (acetaminophen.)  You have signs or symptoms of an infection such as:  Fever over 100 degrees Fahrenheit  Redness, warmth or foul-smelling drainage from the wound  If you have any questions, or are not sure how to take care of the wound.    Phone numbers:    During business hours (M-F 8:00-4:30 p.m.)  [facility]-  [phone] Option 1 appt. Desk and ask for the Dermatology Surgery Team  [phone] [facility] .     ---------------------------------------------------------  Evenings/Weekends/Holidays  Hospital - [phone]   TTY for hearing gixokcif-[phone]  *Ask  to page dermatologist on-call  Emergency Oppn-[phone]  TTY for hearing impaired- [phone]

## 2024-04-26 LAB
PATH REPORT.COMMENTS IMP SPEC: NORMAL
PATH REPORT.COMMENTS IMP SPEC: NORMAL
PATH REPORT.FINAL DX SPEC: NORMAL
PATH REPORT.GROSS SPEC: NORMAL
PATH REPORT.MICROSCOPIC SPEC OTHER STN: NORMAL
PATH REPORT.RELEVANT HX SPEC: NORMAL

## 2024-06-23 ENCOUNTER — HEALTH MAINTENANCE LETTER (OUTPATIENT)
Age: 49
End: 2024-06-23

## 2024-12-16 ENCOUNTER — OFFICE VISIT (OUTPATIENT)
Dept: DERMATOLOGY | Facility: CLINIC | Age: 49
End: 2024-12-16
Payer: COMMERCIAL

## 2024-12-16 DIAGNOSIS — L57.0 ACTINIC KERATOSIS: ICD-10-CM

## 2024-12-16 DIAGNOSIS — D48.9 NEOPLASM OF UNCERTAIN BEHAVIOR: ICD-10-CM

## 2024-12-16 DIAGNOSIS — Z12.83 SKIN CANCER SCREENING: Primary | ICD-10-CM

## 2024-12-16 DIAGNOSIS — D22.9 MULTIPLE NEVI: ICD-10-CM

## 2024-12-16 DIAGNOSIS — Z85.828 HISTORY OF NONMELANOMA SKIN CANCER: ICD-10-CM

## 2024-12-16 DIAGNOSIS — L30.9 DERMATITIS: ICD-10-CM

## 2024-12-16 DIAGNOSIS — L82.1 SEBORRHEIC KERATOSIS: ICD-10-CM

## 2024-12-16 DIAGNOSIS — L81.4 LENTIGO: ICD-10-CM

## 2024-12-16 PROCEDURE — 88305 TISSUE EXAM BY PATHOLOGIST: CPT | Performed by: PATHOLOGY

## 2024-12-16 RX ORDER — TRIAMCINOLONE ACETONIDE 1 MG/G
CREAM TOPICAL
Qty: 15 G | Refills: 1 | Status: SHIPPED | OUTPATIENT
Start: 2024-12-16

## 2024-12-16 NOTE — LETTER
12/16/2024      Carloz Nicolas  [address] [place] [address]      Dear Colleague,    Thank you for referring your patient, Carloz Nicolas, to the [facility]. Please see a copy of my visit note below.    [facility] Dermatology Note  Encounter Date: Dec 16, 2024  Office Visit     Reviewed patients past medical history and pertinent chart review prior to patients visit today.     Dermatology Problem List:  Last skin check: 12/16/2024  # Neoplasm of uncertain behavior: left shoulder, s/p shave biopsy 12/16/2024   # History of NMSC  - nBCC, right lower chest, s/p shave 1/23/24, s/p exc 4/24/24  - nBCC, left posterior shoulder, s/p shave 1/23/24, s/p exc 4/24/24  # AK  - Lichenoid AK, left posterior forearm, s/p shave 1/23/24  - s/p cryo 12/16/24  # Dermatitis,-triamcinolone cream    Family Hx: Negative for family history of skin cancer.    Social Hx: he owns a [employer] business  _________________________________________    Assessment & Plan:     # Neoplasm of uncertain behavior:  left shoulder.  DDx includes superficial BCC. Shave biopsy today.  Photograph obtained.  Procedure Note: Biopsy by shave technique  The risks and benefits of the procedure were described to the patient. These include but are not limited to bleeding, infection, scar, incomplete removal, and non-diagnostic biopsy. Verbal informed consent was obtained. The above site(s) was cleansed with an alcohol pad and injected with 1% lidocaine with epinephrine. Once anesthesia was obtained, a biopsy(ies) was performed with Gilette blade. The tissue(s) was placed in a labeled container(s) with formalin and sent to pathology. Hemostasis was achieved with aluminum chloride. Vaseline and a bandage were applied to the wound(s). The patient tolerated the procedure well and was given post biopsy care instructions.    # Actinic keratoses, bilateral forearms x5  Actinic keratoses are pre-cancerous skin growths  caused by sun exposure. Treatment is recommended and medically indicated. Treated with cryotherapy as outlined below.     Procedures performed:   - Cryotherapy procedure note, location(s): See physical exam. After verbal consent and discussion of risks and benefits including, but not limited to, dyspigmentation/scar, blister, and pain, 5 lesion(s) was(were) treated with 1-2 mm freeze border for 1-2 cycles with liquid nitrogen. Post cryotherapy instructions were provided.     # Dermatitis, left shin  - Start triamcinolone cream to the affected area twice daily for up to 2-3 weeks at a time or until resolution. Thereafter, may use as needed for flares. Potential side effects from prolonged topical steroid use such as skin atrophy were reviewed. Do not apply topical steroid to face or skin folds.    -Encouraged regular use of an emollient such as Vanicream, Cera Ve Cream or Cetaphil Cream to the entire body.     # Personal history of NMSC, no evidence of recurrence  # Multiple nevi, trunk and extremities  # Solar lentigines  - Continued observation recommended.   - Nevi demonstrate no concerning features on dermoscopy. We discussed the importance of self exams at home.   - ABCDEs: Counseled ABCDEs of melanoma: Asymmetry, Border (irregularity), Color (not uniform, changes in color), Diameter (greater than 6 mm which is about the size of a pencil eraser), and Evolving (any changes in preexisting moles).  - Sun protection: Counseled SPF 30+ sunscreen, UPF clothing, sun avoidance, tanning bed avoidance.  - Follow up in 6 months for a repeat skin check.    # Seborrheic keratoses  - We discussed the benign nature of the skin lesions. No treatment required. Continued observation recommended. Follow up with any concerns.      Follow-up:  6 months for follow up full body skin exam, prn for new or changing lesions or new concerns    All risks, benefits and alternatives were discussed with patient.  Patient is in agreement and  understands the assessment and plan.  All questions were answered.  Hoa Mendenhall PA-C  [facility] Dermatology  ____________________________________________    CC: Skin cancer screening exam    HPI:  Mr. Carloz Nicolas is a(n) 49 year old male who presents today as a return patient for a full body skin cancer screening. Patient has concerns today about several lesions.  He has a tender lesion on his left shoulder.  He also notes several pruritic lesions on his left shin.  Lastly, he has noticed some red scaly lesions on his forearms.     Patient is diligent with photoprotection. Patient is otherwise feeling well, without additional skin concerns.     Physical Exam:  Vitals: There were no vitals taken for this visit.  SKIN: Total skin excluding the genitalia areas was performed. The exam included the head/face, neck, both arms, chest, back, abdomen, both legs, digits, mons pubis, buttock and nails.   -Kessler I.  -left shoulder, 1 cm erythematous pink shiny patch with slight adherent scale  -bilateral forearms, pink macule(s) with overlying adherent scale consistent with an actinic keratosis   - left shin x3, pink scaly papules  -There is a well healed scar involving the right lower chest and left posterior shoulder without evidence of recurrence.  -multiple tan/brown flat round macules and raised papules scattered throughout trunk, extremities, and face consistent with benign appearing nevi. No worrisome features for malignancy noted on examination.  -scattered tan, homogenous macules scattered on sun exposed areas of trunk, extremities, and face consistent with solar lentigines.   -few waxy, stuck on tan/brown papules and patches on the trunk and extremities consistent with seborrheic keratoses.    - No other lesions of concern on areas examined.               Medications:  Current Outpatient Medications   Medication Sig Dispense Refill     ascorbic acid (VITAMIN C) 1000 MG TABS Take 500 mg by mouth (Patient  not taking: Reported on 5/2/2023)       Cholecalciferol 10 MCG (400 UNIT) CAPS Take 400 Units by mouth daily (Patient not taking: Reported on 5/30/2023)       DULoxetine (CYMBALTA) 30 MG capsule Take 60 mg by mouth daily       DULoxetine (CYMBALTA) 60 MG capsule Take 60 mg by mouth daily (Patient not taking: Reported on 1/23/2024)       Omega-3 Fatty Acids (FISH OIL OMEGA-3 PO)  (Patient not taking: Reported on 1/23/2024)       oxyCODONE (ROXICODONE) 5 MG immediate release tablet Take 1-2 tablets by mouth every 4 hours if needed for Pain (max 8 per day)   USE DATES:06/0116-6/15/2016       tiZANidine (ZANAFLEX) 4 MG tablet TAKE 1 TABLET BY MOUTH AT BEDTIME IF NEEDED FOR MUSCLE SPASM. (Patient not taking: Reported on 1/23/2024)       zolpidem (AMBIEN) 5 MG tablet Take 5 mg by mouth nightly as needed       No current facility-administered medications for this visit.      Past Medical History:   Patient Active Problem List   Diagnosis     Chronic pain of both shoulders     Complex regional pain syndrome type 1 of right lower extremity     Bilateral carpal tunnel syndrome     Neck pain     Sebaceous cyst     Back pain at L4-L5 level     Chicken pox pneumonia     Controlled substance agreement signed     Drug-induced erectile dysfunction     Epicondylitis, lateral, right     Facet degeneration of lumbar region     Hyperlipidemia     Lumbar radicular pain     Paresthesia of left arm     Post herpetic neuralgia     Right flank pain     Right ureteral stone     Solitary pulmonary nodule     Thrombocytopenia, unspecified (H)     No past medical history on file.    CC Referred Self, MD  No address on file on close of this encounter.      Again, thank you for allowing me to participate in the care of your patient.        Sincerely,        Josette Mendenhall PA-C

## 2024-12-16 NOTE — PROGRESS NOTES
[facility] Dermatology Note  Encounter Date: Dec 16, 2024  Office Visit     Reviewed patients past medical history and pertinent chart review prior to patients visit today.     Dermatology Problem List:  Last skin check: 12/16/2024  # Neoplasm of uncertain behavior: left shoulder, s/p shave biopsy 12/16/2024   # History of NMSC  - nBCC, right lower chest, s/p shave 1/23/24, s/p exc 4/24/24  - nBCC, left posterior shoulder, s/p shave 1/23/24, s/p exc 4/24/24  # AK  - Lichenoid AK, left posterior forearm, s/p shave 1/23/24  - s/p cryo 12/16/24  # Dermatitis,-triamcinolone cream    Family Hx: Negative for family history of skin cancer.    Social Hx: he owns a [employer] business  _________________________________________    Assessment & Plan:     # Neoplasm of uncertain behavior:  left shoulder.  DDx includes superficial BCC. Shave biopsy today.  Photograph obtained.  Procedure Note: Biopsy by shave technique  The risks and benefits of the procedure were described to the patient. These include but are not limited to bleeding, infection, scar, incomplete removal, and non-diagnostic biopsy. Verbal informed consent was obtained. The above site(s) was cleansed with an alcohol pad and injected with 1% lidocaine with epinephrine. Once anesthesia was obtained, a biopsy(ies) was performed with Gilette blade. The tissue(s) was placed in a labeled container(s) with formalin and sent to pathology. Hemostasis was achieved with aluminum chloride. Vaseline and a bandage were applied to the wound(s). The patient tolerated the procedure well and was given post biopsy care instructions.    # Actinic keratoses, bilateral forearms x5  Actinic keratoses are pre-cancerous skin growths caused by sun exposure. Treatment is recommended and medically indicated. Treated with cryotherapy as outlined below.     Procedures performed:   - Cryotherapy procedure note, location(s): See physical exam. After verbal consent and  discussion of risks and benefits including, but not limited to, dyspigmentation/scar, blister, and pain, 5 lesion(s) was(were) treated with 1-2 mm freeze border for 1-2 cycles with liquid nitrogen. Post cryotherapy instructions were provided.     # Dermatitis, left shin  - Start triamcinolone cream to the affected area twice daily for up to 2-3 weeks at a time or until resolution. Thereafter, may use as needed for flares. Potential side effects from prolonged topical steroid use such as skin atrophy were reviewed. Do not apply topical steroid to face or skin folds.    -Encouraged regular use of an emollient such as Vanicream, Cera Ve Cream or Cetaphil Cream to the entire body.     # Personal history of NMSC, no evidence of recurrence  # Multiple nevi, trunk and extremities  # Solar lentigines  - Continued observation recommended.   - Nevi demonstrate no concerning features on dermoscopy. We discussed the importance of self exams at home.   - ABCDEs: Counseled ABCDEs of melanoma: Asymmetry, Border (irregularity), Color (not uniform, changes in color), Diameter (greater than 6 mm which is about the size of a pencil eraser), and Evolving (any changes in preexisting moles).  - Sun protection: Counseled SPF 30+ sunscreen, UPF clothing, sun avoidance, tanning bed avoidance.  - Follow up in 6 months for a repeat skin check.    # Seborrheic keratoses  - We discussed the benign nature of the skin lesions. No treatment required. Continued observation recommended. Follow up with any concerns.      Follow-up:  6 months for follow up full body skin exam, prn for new or changing lesions or new concerns    All risks, benefits and alternatives were discussed with patient.  Patient is in agreement and understands the assessment and plan.  All questions were answered.  Hoa Mendenhall PA-C  [facility] Dermatology  ____________________________________________    CC: Skin cancer screening exam    HPI:  Mr. Carloz Nicolas is a(n 49  year old male who presents today as a return patient for a full body skin cancer screening. Patient has concerns today about several lesions.  He has a tender lesion on his left shoulder.  He also notes several pruritic lesions on his left shin.  Lastly, he has noticed some red scaly lesions on his forearms.     Patient is diligent with photoprotection. Patient is otherwise feeling well, without additional skin concerns.     Physical Exam:  Vitals: There were no vitals taken for this visit.  SKIN: Total skin excluding the genitalia areas was performed. The exam included the head/face, neck, both arms, chest, back, abdomen, both legs, digits, mons pubis, buttock and nails.   -Kessler I.  -left shoulder, 1 cm erythematous pink shiny patch with slight adherent scale  -bilateral forearms, pink macule(s) with overlying adherent scale consistent with an actinic keratosis   - left shin x3, pink scaly papules  -There is a well healed scar involving the right lower chest and left posterior shoulder without evidence of recurrence.  -multiple tan/brown flat round macules and raised papules scattered throughout trunk, extremities, and face consistent with benign appearing nevi. No worrisome features for malignancy noted on examination.  -scattered tan, homogenous macules scattered on sun exposed areas of trunk, extremities, and face consistent with solar lentigines.   -few waxy, stuck on tan/brown papules and patches on the trunk and extremities consistent with seborrheic keratoses.    - No other lesions of concern on areas examined.               Medications:  Current Outpatient Medications   Medication Sig Dispense Refill    ascorbic acid (VITAMIN C) 1000 MG TABS Take 500 mg by mouth (Patient not taking: Reported on 5/2/2023)      Cholecalciferol 10 MCG (400 UNIT) CAPS Take 400 Units by mouth daily (Patient not taking: Reported on 5/30/2023)      DULoxetine (CYMBALTA) 30 MG capsule Take 60 mg by mouth daily       DULoxetine (CYMBALTA) 60 MG capsule Take 60 mg by mouth daily (Patient not taking: Reported on 1/23/2024)      Omega-3 Fatty Acids (FISH OIL OMEGA-3 PO)  (Patient not taking: Reported on 1/23/2024)      oxyCODONE (ROXICODONE) 5 MG immediate release tablet Take 1-2 tablets by mouth every 4 hours if needed for Pain (max 8 per day)   USE DATES:06/0116-6/15/2016      tiZANidine (ZANAFLEX) 4 MG tablet TAKE 1 TABLET BY MOUTH AT BEDTIME IF NEEDED FOR MUSCLE SPASM. (Patient not taking: Reported on 1/23/2024)      zolpidem (AMBIEN) 5 MG tablet Take 5 mg by mouth nightly as needed       No current facility-administered medications for this visit.      Past Medical History:   Patient Active Problem List   Diagnosis    Chronic pain of both shoulders    Complex regional pain syndrome type 1 of right lower extremity    Bilateral carpal tunnel syndrome    Neck pain    Sebaceous cyst    Back pain at L4-L5 level    Chicken pox pneumonia    Controlled substance agreement signed    Drug-induced erectile dysfunction    Epicondylitis, lateral, right    Facet degeneration of lumbar region    Hyperlipidemia    Lumbar radicular pain    Paresthesia of left arm    Post herpetic neuralgia    Right flank pain    Right ureteral stone    Solitary pulmonary nodule    Thrombocytopenia, unspecified (H)     No past medical history on file.    CC Referred Self, MD  No address on file on close of this encounter.

## 2024-12-16 NOTE — PATIENT INSTRUCTIONS
Wound Care After a Biopsy    What is a skin biopsy?  A skin biopsy allows the doctor to examine a very small piece of tissue under the microscope to determine the diagnosis and the best treatment for the skin condition. A local anesthetic (numbing medicine)  is injected with a very small needle into the skin area to be tested. A small piece of skin is taken from the area. Sometimes a suture (stitch) is used.     What are the risks of a skin biopsy?  I will experience scar, bleeding, swelling, pain, crusting and redness. I may experience incomplete removal or recurrence. Risks of this procedure are excessive bleeding, bruising, infection, nerve damage, numbness, thick (hypertrophic or keloidal) scar and non-diagnostic biopsy.    How should I care for my wound for the first 24 hours?  Keep the wound dry and covered for 24 hours  If it bleeds, hold direct pressure on the area for 15 minutes. If bleeding does not stop then go to the emergency room  Avoid strenuous exercise the first 1-2 days or as your doctor instructs you    How should I care for the wound after 24 hours?  After 24 hours, remove the bandage  You may bathe or shower as normal  If you had a scalp biopsy, you can shampoo as usual and can use shower water to clean the biopsy site daily  Clean the wound twice a day with gentle soap and water  Do not scrub, be gentle  Apply white petroleum/Vaseline after cleaning the wound with a cotton swab or a clean finger, and keep the site covered with a Bandaid /bandage. Bandages are not necessary with a scalp biopsy  If you are unable to cover the site with a Bandaid /bandage, re-apply ointment 2-3 times a day to keep the site moist. Moisture will help with healing  Avoid strenuous activity for first 1-2 days  Avoid lakes, rivers, pools, and oceans until the stitches are removed or the site is healed    How do I clean my wound?  Wash hands thoroughly with soap or use hand  before all wound care  Clean the  wound with gentle soap and water  Apply white petroleum/Vaseline  to wound after it is clean  Replace the Bandaid /bandage to keep the wound covered for the first few days or as instructed by your doctor  If you had a scalp biopsy, warm shower water to the area on a daily basis should suffice    What should I use to clean my wound?   Cotton-tipped applicators (Qtips )  White petroleum jelly (Vaseline ). Use a clean new container and use Q-tips to apply.  Bandaids   as needed  Gentle soap     How should I care for my wound long term?  Do not get your wound dirty  Keep up with wound care for one week or until the area is healed.  A small scab will form and fall off by itself when the area is completely healed. The area will be red and will become pink in color as it heals. Sun protection is very important for how your scar will turn out. Sunscreen with an SPF 30 or greater is recommended once the area is healed.  You should have some soreness but it should be mild and slowly go away over several days. Talk to your doctor about using tylenol for pain,    When should I call my doctor?  If you have increased:   Pain or swelling  Pus or drainage (clear or slightly yellow drainage is ok)  Temperature over 100F  Spreading redness or warmth around wound    When will I hear about my results?  The biopsy results can take 2 weeks to come back.  Your results will automatically release to [employer] before your provider has even reviewed them.  The clinic will call you with the results, send you a Jem[employer] message, or have you schedule a follow-up clinic or phone time to discuss the results.  Contact our clinics if you do not hear from us in 2 weeks. If further treatment is needed for a skin cancer, this will be done at either our [place] or [place] office.    Who should I call with questions?  [facility]: [phone]  [facility]: [phone]  For urgent  needs outside of business hours call the [facility] at [phone] and ask for the dermatology resident on call   Cryotherapy    What is it?  Use of a very cold liquid, such as liquid nitrogen, to freeze and destroy abnormal skin cells that need to be removed    What should I expect?  Tenderness and redness  A small blister that might grow and fill with dark purple blood. There may be crusting.  More than one treatment may be needed if the lesions do not go away.    How do I care for the treated area?  Gently wash the area with your hands when bathing.  Use a thin layer of Vaseline to help with healing. You may use a Band-Aid.   The area should heal within 7-10 days and may leave behind a pink or lighter color.   Do not use an antibiotic or Neosporin ointment.   You may take acetaminophen (Tylenol) for pain.     Call your doctor if you have:  Severe pain  Signs of infection (warmth, redness, cloudy yellow drainage, and or a bad smell)  Questions or concerns    Who should I call with questions?      [facility]: [phone]      [facility]: [phone]      For urgent needs outside of business hours call the [facility] at [phone] and ask for the dermatology resident on call Patient Education       Proper skin care from [facility]:    -Eliminate harsh soaps as they strip the natural oils from the skin, often resulting in dry itchy skin ( i.e. Dial, Zest, [place] Spring)  -Use mild soaps such as Cetaphil or Dove Sensitive Skin in the shower. You do not need to use soap on arms, legs, and trunk every time you shower unless visibly soiled.   -Avoid hot or cold showers.  -After showering, lightly dry off and apply moisturizing within 2-3 minutes. This will help trap moisture in the skin.   -Aggressive use of a moisturizer at least 1-2 times a day to the entire body (including -Vanicream, Cetaphil, Aquaphor or Cerave) and moisturize  hands after every washing.  -We recommend using moisturizers that come in a tub that needs to be scooped out, not a pump. This has more of an oil base. It will hold moisture in your skin much better than a water base moisturizer. The above recommended are non-pore clogging.      Wear a sunscreen with at least SPF 30 on your face, ears, neck and V of the chest daily. Wear sunscreen on other areas of the body if those areas are exposed to the sun throughout the day. Sunscreens can contain physical and/or chemical blockers. Physical blockers are less likely to clog pores, these include zinc oxide and titanium dioxide. Reapply every two hour and after swimming.     Sunscreen examples: https://www.ewg.org/sunscreen/    UV radiation  UVA radiation remains constant throughout the day and throughout the year. It is a longer wavelength than UVB and therefore penetrates deeper into the skin leading to immediate and delayed tanning, photoaging, and skin cancer. 70-80% of UVA and UVB radiation occurs between the hours of 10am-2pm.  UVB radiation  UVB radiation causes the most harmful effects and is more significant during the summer months. However, snow and ice can reflect UVB radiation leading to skin damage during the winter months as well. UVB radiation is responsible for tanning, burning, inflammation, delayed erythema (pinkness), pigmentation (brown spots), and skin cancer.     I recommend self monthly full body exams and yearly full body exams with a dermatology provider. If you develop a new or changing lesion please follow up for examination. Most skin cancers are pink and scaly or pink and pearly. However, we do see blue/brown/black skin cancers.  Consider the ABCDEs of melanoma when giving yourself your monthly full body exam ( don't forget the groin, buttocks, feet, toes, etc). A-asymmetry, B-borders, C-color, D-diameter, E-elevation or evolving. If you see any of these changes please follow up in clinic. If you  cannot see your back I recommend purchasing a hand held mirror to use with a larger wall mirror.       Checking for Skin Cancer  You can find cancer early by checking your skin each month. There are 3 kinds of skin cancer. They are melanoma, basal cell carcinoma, and squamous cell carcinoma. Doing monthly skin checks is the best way to find new marks or skin changes. Follow the instructions below for checking your skin.   The ABCDEs of checking moles for melanoma   Check your moles or growths for signs of melanoma using ABCDE:   Asymmetry: the sides of the mole or growth don t match  Border: the edges are ragged, notched, or blurred  Color: the color within the mole or growth varies  Diameter: the mole or growth is larger than 6 mm (size of a pencil eraser)  Evolving: the size, shape, or color of the mole or growth is changing (evolving is not shown in the images below)    Checking for other types of skin cancer  Basal cell carcinoma or squamous cell carcinoma have symptoms such as:     A spot or mole that looks different from all other marks on your skin  Changes in how an area feels, such as itching, tenderness, or pain  Changes in the skin's surface, such as oozing, bleeding, or scaliness  A sore that does not heal  New swelling or redness beyond the border of a mole    Who s at risk?  Anyone can get skin cancer. But you are at greater risk if you have:   Fair skin, light-colored hair, or light-colored eyes  Many moles or abnormal moles on your skin  A history of sunburns from sunlight or tanning beds  A family history of skin cancer  A history of exposure to radiation or chemicals  A weakened immune system  If you have had skin cancer in the past, you are at risk for recurring skin cancer.   How to check your skin  Do your monthly skin checkups in front of a full-length mirror. Check all parts of your body, including your:   Head (ears, face, neck, and scalp)  Torso (front, back, and sides)  Arms (tops,  undersides, upper, and lower armpits)  Hands (palms, backs, and fingers, including under the nails)  Buttocks and genitals  Legs (front, back, and sides)  Feet (tops, soles, toes, including under the nails, and between toes)  If you have a lot of moles, take digital photos of them each month. Make sure to take photos both up close and from a distance. These can help you see if any moles change over time.   Most skin changes are not cancer. But if you see any changes in your skin, call your doctor right away. Only he or she can diagnose a problem. If you have skin cancer, seeing your doctor can be the first step toward getting the treatment that could save your life.   [employer] last reviewed this educational content on 4/1/2019 2000-2020 [employer] [address] [place] [address]. All rights reserved. This information is not intended as a substitute for professional medical care. Always follow your healthcare professional's instructions.       When should I call my doctor?  If you are worsening or not improving, please, contact us or seek urgent care as noted below.     Who should I call with questions (adults)?  [facility] (adult and pediatric): [phone]  [facility] (adult): [phone]  [facility] ([facility]) [phone]  For urgent needs outside of business hours call the [facility] at [phone] and ask for the dermatology resident on call to be paged  If this is a medical emergency and you are unable to reach an ER, Call [phone]      If you need a prescription refill, please contact your pharmacy. Refills are approved or denied by our Physicians during normal business hours, Monday through Fridays  Per office policy, refills will not be granted if you have not been seen within the past year (or sooner depending on your child's condition

## 2025-07-12 ENCOUNTER — HEALTH MAINTENANCE LETTER (OUTPATIENT)
Age: 50
End: 2025-07-12

## (undated) DEVICE — NDL 19GA 1.5"

## (undated) DEVICE — SU MONOCRYL 4-0 PS-2 18" UND Y496G

## (undated) DEVICE — ESU PENCIL SMOKE EVAC W/ROCKER SWITCH 0703-047-000

## (undated) DEVICE — SU VICRYL 3-0 SH 27" J316H

## (undated) DEVICE — DECANTER TRANSFER DEVICE 2008S

## (undated) DEVICE — GLOVE PROTEXIS W/NEU-THERA 7.5  2D73TE75

## (undated) DEVICE — DRAPE LAP W/ARMBOARD 29410

## (undated) DEVICE — SOL WATER IRRIG 1000ML BOTTLE 07139-09

## (undated) DEVICE — SUCTION TIP YANKAUER W/O VENT K86

## (undated) DEVICE — PACK MINOR SBA15MIFSE

## (undated) DEVICE — PREP CHLORAPREP 26ML TINTED ORANGE  260815

## (undated) DEVICE — GLOVE PROTEXIS BLUE W/NEU-THERA 7.5  2D73EB75

## (undated) DEVICE — ADH SKIN CLOSURE PREMIERPRO EXOFIN 1.0ML 3470

## (undated) RX ORDER — LIDOCAINE HYDROCHLORIDE 20 MG/ML
INJECTION, SOLUTION INFILTRATION; PERINEURAL
Status: DISPENSED
Start: 2020-12-02

## (undated) RX ORDER — FENTANYL CITRATE 50 UG/ML
INJECTION, SOLUTION INTRAMUSCULAR; INTRAVENOUS
Status: DISPENSED
Start: 2020-12-02

## (undated) RX ORDER — ACETAMINOPHEN 325 MG/1
TABLET ORAL
Status: DISPENSED
Start: 2020-12-02

## (undated) RX ORDER — PROPOFOL 10 MG/ML
INJECTION, EMULSION INTRAVENOUS
Status: DISPENSED
Start: 2020-12-02

## (undated) RX ORDER — CEFAZOLIN SODIUM 1 G/3ML
INJECTION, POWDER, FOR SOLUTION INTRAMUSCULAR; INTRAVENOUS
Status: DISPENSED
Start: 2020-12-02